# Patient Record
Sex: MALE | Race: ASIAN | NOT HISPANIC OR LATINO | Employment: UNEMPLOYED
[De-identification: names, ages, dates, MRNs, and addresses within clinical notes are randomized per-mention and may not be internally consistent; named-entity substitution may affect disease eponyms.]

---

## 2017-01-03 DIAGNOSIS — M10.061 IDIOPATHIC GOUT OF RIGHT KNEE, UNSPECIFIED CHRONICITY: Primary | ICD-10-CM

## 2017-01-03 RX ORDER — ALLOPURINOL 300 MG/1
300 TABLET ORAL DAILY
Qty: 90 TABLET | Refills: 1 | Status: SHIPPED
Start: 2017-01-03 | End: 2022-05-10

## 2021-05-15 ENCOUNTER — RECORDS - HEALTHEAST (OUTPATIENT)
Dept: ADMINISTRATIVE | Facility: OTHER | Age: 36
End: 2021-05-15

## 2021-05-15 ASSESSMENT — MIFFLIN-ST. JEOR: SCORE: 1828.47

## 2021-05-16 ENCOUNTER — COMMUNICATION - HEALTHEAST (OUTPATIENT)
Dept: SCHEDULING | Facility: CLINIC | Age: 36
End: 2021-05-16

## 2021-05-16 ENCOUNTER — RECORDS - HEALTHEAST (OUTPATIENT)
Dept: ADMINISTRATIVE | Facility: OTHER | Age: 36
End: 2021-05-16

## 2021-05-16 ASSESSMENT — MIFFLIN-ST. JEOR: SCORE: 1821.66

## 2021-05-17 ENCOUNTER — HOSPITAL ENCOUNTER (OUTPATIENT)
Dept: CARDIOLOGY | Facility: HOSPITAL | Age: 36
Discharge: HOME | End: 2021-05-17
Attending: INTERNAL MEDICINE

## 2021-05-17 ENCOUNTER — SURGERY - HEALTHEAST (OUTPATIENT)
Dept: CARDIOLOGY | Facility: HOSPITAL | Age: 36
End: 2021-05-17

## 2021-05-17 ENCOUNTER — RECORDS - HEALTHEAST (OUTPATIENT)
Dept: ADMINISTRATIVE | Facility: OTHER | Age: 36
End: 2021-05-17

## 2021-05-17 ASSESSMENT — MIFFLIN-ST. JEOR: SCORE: 1808.05

## 2021-05-18 ENCOUNTER — RECORDS - HEALTHEAST (OUTPATIENT)
Dept: ADMINISTRATIVE | Facility: OTHER | Age: 36
End: 2021-05-18

## 2021-05-18 ASSESSMENT — MIFFLIN-ST. JEOR: SCORE: 1767.23

## 2021-05-19 ENCOUNTER — RECORDS - HEALTHEAST (OUTPATIENT)
Dept: ADMINISTRATIVE | Facility: OTHER | Age: 36
End: 2021-05-19

## 2021-05-19 ASSESSMENT — MIFFLIN-ST. JEOR: SCORE: 1772.67

## 2021-05-25 ENCOUNTER — OFFICE VISIT - HEALTHEAST (OUTPATIENT)
Dept: CARDIOLOGY | Facility: CLINIC | Age: 36
End: 2021-05-25

## 2021-05-25 DIAGNOSIS — I10 ESSENTIAL HYPERTENSION, BENIGN: ICD-10-CM

## 2021-05-25 DIAGNOSIS — I50.41 ACUTE HEART FAILURE WITH REDUCED EJECTION FRACTION AND DIASTOLIC DYSFUNCTION (H): ICD-10-CM

## 2021-05-25 DIAGNOSIS — E11.59 TYPE 2 DIABETES MELLITUS WITH OTHER CIRCULATORY COMPLICATION, WITHOUT LONG-TERM CURRENT USE OF INSULIN (H): ICD-10-CM

## 2021-05-25 LAB
ANION GAP SERPL CALCULATED.3IONS-SCNC: 11 MMOL/L (ref 5–18)
BUN SERPL-MCNC: 44 MG/DL (ref 8–22)
CALCIUM SERPL-MCNC: 9.5 MG/DL (ref 8.5–10.5)
CHLORIDE BLD-SCNC: 100 MMOL/L (ref 98–107)
CO2 SERPL-SCNC: 22 MMOL/L (ref 22–31)
CREAT SERPL-MCNC: 2.28 MG/DL (ref 0.7–1.3)
GFR SERPL CREATININE-BSD FRML MDRD: 32 ML/MIN/1.73M2
GLUCOSE BLD-MCNC: 195 MG/DL (ref 70–125)
POTASSIUM BLD-SCNC: 4.6 MMOL/L (ref 3.5–5)
SODIUM SERPL-SCNC: 133 MMOL/L (ref 136–145)

## 2021-05-25 ASSESSMENT — MIFFLIN-ST. JEOR: SCORE: 1803.97

## 2021-05-26 ENCOUNTER — COMMUNICATION - HEALTHEAST (OUTPATIENT)
Dept: CARDIOLOGY | Facility: CLINIC | Age: 36
End: 2021-05-26

## 2021-05-26 DIAGNOSIS — I50.41 ACUTE HEART FAILURE WITH REDUCED EJECTION FRACTION AND DIASTOLIC DYSFUNCTION (H): ICD-10-CM

## 2021-05-27 VITALS — BODY MASS INDEX: 37.18 KG/M2 | WEIGHT: 209.9 LBS

## 2021-05-27 VITALS — WEIGHT: 218.9 LBS | BODY MASS INDEX: 38.78 KG/M2

## 2021-05-27 VITALS — WEIGHT: 211.1 LBS | BODY MASS INDEX: 37.39 KG/M2

## 2021-05-27 VITALS — BODY MASS INDEX: 39.57 KG/M2 | BODY MASS INDEX: 38.97 KG/M2 | WEIGHT: 223.4 LBS | WEIGHT: 220 LBS

## 2021-05-27 VITALS — BODY MASS INDEX: 38.26 KG/M2 | HEIGHT: 63 IN

## 2021-05-27 VITALS — WEIGHT: 221.9 LBS | BODY MASS INDEX: 39.31 KG/M2

## 2021-06-01 ENCOUNTER — COMMUNICATION - HEALTHEAST (OUTPATIENT)
Dept: SCHEDULING | Facility: CLINIC | Age: 36
End: 2021-06-01

## 2021-06-02 ENCOUNTER — COMMUNICATION - HEALTHEAST (OUTPATIENT)
Dept: CARDIOLOGY | Facility: CLINIC | Age: 36
End: 2021-06-02

## 2021-06-04 ENCOUNTER — COMMUNICATION - HEALTHEAST (OUTPATIENT)
Dept: CARDIOLOGY | Facility: CLINIC | Age: 36
End: 2021-06-04

## 2021-06-19 ENCOUNTER — HEALTH MAINTENANCE LETTER (OUTPATIENT)
Age: 36
End: 2021-06-19

## 2021-06-25 NOTE — TELEPHONE ENCOUNTER
----- Message from Jammie Webb CNP sent at 6/2/2021  9:52 AM CDT -----  Please inform pt of improved kidney function. No new orders, thank you  ----- Message -----  From: Karol Manuel RN  Sent: 6/2/2021   9:42 AM CDT  To: Jammie Webb CNP    FYI patient was in ED for URI 5/31 and got BMP done.

## 2021-06-25 NOTE — TELEPHONE ENCOUNTER
Called patient and LM that we would hold off on renewing Jardiance at this time and would defer to PMD. Also, await results of repeat BMP next week. [place] [place]

## 2021-06-25 NOTE — TELEPHONE ENCOUNTER
----- Message from Jammie Webb CNP sent at 5/26/2021  8:32 AM CDT -----  Please inform pt of lab results. Kidney function has decreased. Please have him stop torsemide and monitor for s/s of fluid retention and call us. BMP in one week. Thank you

## 2021-06-25 NOTE — TELEPHONE ENCOUNTER
Zulema was contacted today with lab results and recommendations to STOP torsemide at this time due to rising Creatinine. Monitor for s/s fluid retention. To Call if these occur. Repeat BMP in one week, orders placed. He verbalized understanding of this plan.    FYI to Sveta Hou RN BSN, CHFN

## 2021-06-25 NOTE — TELEPHONE ENCOUNTER
----- Message from Yesenia Dubon MD sent at 5/25/2021  6:16 PM CDT -----  Sorry I just noted creat is now up so no, do not renew, have primary address, the Jardiance can increase the creat.  LF  ----- Message -----  From: Genia Garsia RN  Sent: 5/25/2021   3:34 PM CDT  To: Yesenia Dubon MD    Ok to refill 10 mg daily Jardiance for patient? Or should this be taken over by PCP for DM management?   ----- Message -----  From: Jammie Webb CNP  Sent: 5/25/2021   3:30 PM CDT  To: MK Jang/Jan,    Will you please refill jardiance for this patient as Dr Dubon's started it in the hospital. Thank you            Jammie,  Dr. Dubon initially said to renew but reviewed labs and does not want to renew Jardiance just yet. I'll call and let him know- just an FYI is all.  Thanks!  Jan

## 2021-06-26 NOTE — PATIENT INSTRUCTIONS - HE
Zulema Hampton,    It was a pleasure to see you today at [facility].     My recommendations after this visit include:  - Please follow up with Dr Dubon in 6 weeks   - Please follow up with Jammie Webb in 3 weeks   - I have checked labs and will contact you with results  - Continue current medications    Jammie Webb CNP    What is the [facility]?     The [facility] is a heart failure specialty clinic within [facility].  You will work with your cardiologist, nurse practitioner, and nurses to carefully adjust medications and learn how to live with heart failure.  The Heart Failure Program will help you:      Better understand your chronic heart condition    Feel better and avoid hospital stays    Monitoring for Symptoms      Call the Heart Failure Phone Line ([phone]) if you have any of these symptoms:     Increased shortness of breath/shortness of breath at rest    Waking up at night with difficulty breathing    Unable to lie down for sleep due to symptoms or needing to sit upright for sleep    Weight gain of 2 pounds a day for 2 days in a row OR 5 pounds in 1 week    Increased swelling in your ankles or legs    Dizziness or lightheadedness    Medications       Take your medications as prescribed    Bring all your medications in their original bottles to every appointment    Avoid non-steroidal anti-inflammatory medications (Advil, Aleve, Ibuprofen, Naprosyn, Naproxen, Celebrex)    Do not stop taking your medications or begin taking over-the-counter or herbal medications without first talking to your doctor or nurse practitioner    Diet and Lifestyle       Limit sodium/salt to 2000 mg daily   o Read food labels for sodium content  o Do not add salt when cooking or add salt at the table    Weigh yourself every day and record in your daily weight log   o Call if you gain 2 pounds a day for 2 days in a row OR 5 pounds in 1  week  o Bring daily weight log to every appointment    Stay active, pace yourself, listen to your body, and rest when tired    Elevate your legs if they are swollen. Ask about using compression/support stockings    Stop smoking    Lose weight if you are overweight    Avoid drinking alcohol or limit amount    Stay updated on your immunizations including flu and pneumonia vaccines

## 2021-07-04 NOTE — PROGRESS NOTES
Progress Notes by Jammie Webb CNP at 5/25/2021  2:50 PM     Author: Jammie Webb CNP Service: -- Author Type: Nurse Practitioner    Filed: 5/25/2021  3:30 PM Encounter Date: 5/25/2021 Status: Signed    : Jammie Webb CNP (Nurse Practitioner)             Assessment/Recommendations   Assessment:    1. Nonischemic cardiomyopathy with systolic dysfunction, NYHA class II: Compensated.  He has mild fatigue and dyspnea on exertion.  He denies orthopnea, PND or edema.  We discussed monitoring symptoms, following a low-sodium diet, monitoring daily weights and heart failure treatment.    2.  Hypotension: Blood pressure 88/62.  He has occasional lightheadedness.  He states he drinks enough fluids during the day.    3.  Diabetes mellitus type 2: He continues Metformin and Lantus.  He was started on Jardiance 10 mg daily in the hospital.    Plan:  1.   Heart failure medications:  - Beta blocker therapy with metroprolol succinate 50 mg daily  - ACEI therapy with lisinopril 2.5 mg daily  - Aldosterone blocker therapy with spironolactone 25 mg daily.    - Diuretic therapy with torsemide 20 mg daily  - SLGT-2 inhibitor with Jardiance 10 mg daily  2.  BMP pending  3.  Continue current medications.  Unable to titrate due to hypotension  4.  Continue daily weights and low-sodium diet    Zulema Hampton will follow up with Dr. Dubon in 6 weeks and in the heart failure clinic in 3 weeks.     History of Present Illness/Subjective    Mr. Zulema Hampton is a 36 y.o. male seen at [facility] heart failure clinic today for continued follow-up.  He follows up for heart failure with reduced ejection fraction.  He was recently hospitalized with cough and orthopnea.  He was diuresed and lost 12 pounds.  He had an echocardiogram which showed ejection fraction of 23%.  He also had coronary angiogram which showed diffuse CAD with no PCI intervention.  He was started on appropriate medications.  He has a past  medical history significant for hypertension, diabetes, obesity.    He states he is doing well since hospitalization.  He continues to have mild cough and dyspnea on exertion.  He denies orthopnea, PND or edema.  He denies lightheadedness, shortness of breath, orthopnea, PND, palpitations, chest pain, abdominal fullness/bloating and lower extremity edema.      He is monitoring home weights which are stable around 215 pounds.      ECHOCARDIOGRAM: 5/16/2021  Summary      1.Left ventricle ejection fraction is severely decreased. The calculated left ventricular ejection fraction is 23%.    2.TAPSE is abnormal, which is consistent with abnormal right ventricular systolic function.    3.Left atrial and left ventricular chamber enlargement.    4.The aortic root is mildly dilated.    5.No hemodynamically significant valvular heart abnormalities.    6.No previous study for comparison.     Coronary angiogram with right heart catheterization 5/17/2021  Conclusion      Acute systolic heart failure in an obese man with newly diagnosed diabetes and hyperlipidemia.    Diffuse mild coronary atherosclerosis with arteries smaller than expected for a 36 year old man consistent with diabetic vasculopathy.    Elevated filling pressures: LV EDP 36 mmHg, PCWP 23-26 mmHg, mean PAP 37 mmHg, RV EDP 13 mmHg, mean RA 13 mmHg.    CO Evette 3.2 l/min, CO thermodilution 3.0 L/min    Estimated blood loss was <20 ml.      Recommendations    ? Recommended follow up with Dr. Dubon in 6 weeks.  ? Continue high dose statin therapy indefinitely.  ? Risk factor management for atherosclerosis.  ? Recommend GDMT therapy for CHF.  ? Follow up visit with Nurse Practitioner in 1-2 weeks.          Physical Examination Review of Systems   Vitals:    05/25/21 1453   BP: (!) 88/62   Pulse: 80   Resp: 16     Body mass index is 38.62 kg/m .  Wt Readings from Last 3 Encounters:   05/25/21 218 lb (98.9 kg)   05/19/21 211 lb 1.6 oz (95.8 kg)   06/08/16 (!) 230 lb  (104.3 kg)       General Appearance:   no acute distress   ENT/Mouth: membranes moist, no oral lesions or bleeding gums.      EYES:  no scleral icterus, normal conjunctivae   Neck: no carotid bruits or thyromegaly   Chest/Lungs:   lungs are clear to auscultation, no rales or wheezing, equal chest wall expansion    Cardiovascular:   Regular. Normal first and second heart sounds with no murmurs, rubs, or gallops; Jugular venous pressure normal, no edema bilaterally    Abdomen:  no organomegaly, masses, bruits, or tenderness; bowel sounds are present   Extremities: no cyanosis or clubbing   Skin: no xanthelasma, warm.    Neurologic: normal  bilateral, no tremors     Psychiatric: alert and oriented x3    General: WNL  Eyes: Visual Distubance  Ears/Nose/Throat: WNL  Lungs: Cough, Shortness of Breath  Heart: Shortness of Breath with activity  Stomach: Diarrhea, Nausea  Bladder: WNL  Muscle/Joints: WNL  Skin: WNL  Nervous System: WNL  Mental Health: WNL     Blood: WNL     Medical History  Surgical History Family History Social History   History reviewed. No pertinent past medical history. Past Surgical History:   Procedure Laterality Date   ? CV CORONARY ANGIOGRAM N/A 5/17/2021    Procedure: Coronary Angiogram;  Surgeon: Jodee Barcenas MD;  Location: [facility] Cardiac [facility];  Service: Cardiology   ? CV LEFT HEART CATHETERIZATION WO LEFT VETRICULOGRAM Left 5/17/2021    Procedure: Left Heart Catheterization Without Left Ventriculogram;  Surgeon: Jodee Barcenas MD;  Location: [facility] Cardiac [facility];  Service: Cardiology   ? CV RIGHT HEART CATH N/A 5/17/2021    Procedure: Right Heart Catheterization;  Surgeon: Jodee Barcenas MD;  Location: [facility]iac [facility];  Service: Cardiology    History reviewed. No pertinent family history. Social History     Socioeconomic History   ? Marital status: Single     Spouse name: Not on file   ? Number of children: Not on file   ? Years of education: Not on file   ?  Highest education level: Not on file   Occupational History   ? Not on file   Social Needs   ? Financial resource strain: Not on file   ? Food insecurity     Worry: Not on file     Inability: Not on file   ? Transportation needs     Medical: Not on file     Non-medical: Not on file   Tobacco Use   ? Smoking status: Never Smoker   ? Smokeless tobacco: Never Used   Substance and Sexual Activity   ? Alcohol use: Not Currently   ? Drug use: Never   ? Sexual activity: Not on file   Lifestyle   ? Physical activity     Days per week: Not on file     Minutes per session: Not on file   ? Stress: Not on file   Relationships   ? Social connections     Talks on phone: Not on file     Gets together: Not on file     Attends Synagogue service: Not on file     Active member of club or organization: Not on file     Attends meetings of clubs or organizations: Not on file     Relationship status: Not on file   ? Intimate partner violence     Fear of current or ex partner: Not on file     Emotionally abused: Not on file     Physically abused: Not on file     Forced sexual activity: Not on file   Other Topics Concern   ? Not on file   Social History Narrative   ? Not on file          Medications  Allergies   Current Outpatient Medications   Medication Sig Dispense Refill   ? acetaminophen (TYLENOL) 500 MG tablet Take 1-2 tablets (500-1,000 mg total) by mouth every 4 (four) hours as needed. 30 tablet 0   ? albuterol (PROAIR HFA;PROVENTIL HFA;VENTOLIN HFA) 90 mcg/actuation inhaler Inhale 4 puffs every 4 (four) hours as needed for wheezing.     ? atorvastatin (LIPITOR) 40 MG tablet Take 1 tablet (40 mg total) by mouth daily. 30 tablet 0   ? empagliflozin (JARDIANCE) 10 mg Tab tablet Take 1 tablet (10 mg total) by mouth every morning. 30 tablet 0   ? LANTUS SOLOSTAR U-100 INSULIN 100 unit/mL (3 mL) pen Inject 15 Units under the skin at bedtime. 11.65 Type 2 with hyperglycemia  Contact provider if insulin prescribed is not the preferred  insulin per insurance. 3 mL 1   ? lisinopriL (PRINIVIL,ZESTRIL) 2.5 MG tablet Take 1 tablet (2.5 mg total) by mouth daily. 90 tablet 0   ? metFORMIN (FORTAMET) 500 MG (OSM) 24 hr tablet Take 1 tablet (500 mg total) by mouth daily with breakfast. 30 tablet 0   ? metoprolol succinate (TOPROL-XL) 50 MG 24 hr tablet Take 1 tablet (50 mg total) by mouth daily. 90 tablet 0   ? spironolactone (ALDACTONE) 25 MG tablet Take 1 tablet (25 mg total) by mouth daily. 90 tablet 0   ? torsemide (DEMADEX) 20 MG tablet Take 1 tablet (20 mg total) by mouth daily. 90 tablet 0     No current facility-administered medications for this visit.     No Known Allergies      Lab Results    Chemistry/lipid CBC Cardiac Enzymes/BNP/TSH/INR   Lab Results   Component Value Date    CHOL 253 (H) 05/15/2021    HDL 54 05/15/2021    LDLCALC 150 (H) 05/15/2021    TRIG 243 (H) 05/15/2021    CREATININE 1.12 05/16/2021    BUN 19 05/16/2021    K 3.9 05/16/2021     05/16/2021     05/16/2021    CO2 23 05/16/2021    Lab Results   Component Value Date    WBC 10.9 05/16/2021    HGB 15.1 05/16/2021    HCT 45.4 05/16/2021    MCV 81 05/16/2021     05/19/2021    Lab Results   Component Value Date    TROPONINI 0.03 05/16/2021     (H) 05/15/2021    TSH 0.89 05/15/2021    INR 1.04 05/15/2021             This note has been dictated using voice recognition software. Any grammatical, typographical, or context distortions are unintentional and inherent to the software    30 minutes spent on the date of encounter doing chart review, review of outside records, review of test results, interpretation with above tests, patient visit and documentation.

## 2021-07-04 NOTE — LETTER
Letter by Karol Manuel RN at      Author: Karol Manuel RN Service: -- Author Type: --    Filed:  Encounter Date: 6/4/2021 Status: (Other)       Dear Mr. Hampton,    Below are the lab results from your recent visit.    Status:  Final result   Visible to patient:  No (not released) Next appt:  06/21/2021 at 02:50 PM in Cardiology (Jammie Webb, CNP)    Ref Range & Units 5/31/21 0906 5/25/21 1530 5/16/21 0415    Sodium 136 - 145 mmol/L 136  133Low   137     Potassium 3.5 - 5.0 mmol/L 4.6  4.6  3.9     Chloride 98 - 107 mmol/L 106  100  103     CO2 22 - 31 mmol/L 21Low   22  23     Anion Gap, Calculation 5 - 18 mmol/L 9  11  11     Glucose 70 - 125 mg/dL 134High   195High   235High      Calcium 8.5 - 10.5 mg/dL 9.3  9.5  8.7     BUN 8 - 22 mg/dL 25High   44High   19     Creatinine 0.70 - 1.30 mg/dL 1.57High   2.28High   1.12     GFR MDRD Af Amer >60 mL/min/1.73m2 60Low   39Low   >60     GFR MDRD Non Af Amer >60 mL/min/1.73m2 50Low   32Low   >60              These results show that your kidney function has improved since your last check. Please call [phone] if you have any questions.     Sincerely,  Sveta Manuel RN

## 2021-10-09 ENCOUNTER — HEALTH MAINTENANCE LETTER (OUTPATIENT)
Age: 36
End: 2021-10-09

## 2021-11-03 DIAGNOSIS — I50.41 ACUTE HEART FAILURE WITH REDUCED EJECTION FRACTION AND DIASTOLIC DYSFUNCTION (H): Primary | ICD-10-CM

## 2021-11-03 RX ORDER — SPIRONOLACTONE 25 MG/1
TABLET ORAL
Qty: 90 TABLET | Refills: 0 | Status: SHIPPED | OUTPATIENT
Start: 2021-11-03

## 2022-01-12 VITALS — BODY MASS INDEX: 37.4 KG/M2 | WEIGHT: 211.1 LBS | HEIGHT: 63 IN

## 2022-01-18 VITALS
HEART RATE: 80 BPM | DIASTOLIC BLOOD PRESSURE: 62 MMHG | WEIGHT: 218 LBS | RESPIRATION RATE: 16 BRPM | BODY MASS INDEX: 38.62 KG/M2 | SYSTOLIC BLOOD PRESSURE: 88 MMHG | HEIGHT: 63 IN

## 2022-01-29 ENCOUNTER — HEALTH MAINTENANCE LETTER (OUTPATIENT)
Age: 37
End: 2022-01-29

## 2022-04-25 DIAGNOSIS — I50.41 ACUTE HEART FAILURE WITH REDUCED EJECTION FRACTION AND DIASTOLIC DYSFUNCTION (H): ICD-10-CM

## 2022-04-26 RX ORDER — TORSEMIDE 20 MG/1
TABLET ORAL
Qty: 30 TABLET | Refills: 0 | Status: SHIPPED | OUTPATIENT
Start: 2022-04-26

## 2022-04-26 NOTE — TELEPHONE ENCOUNTER
I haven't seen the patient since 2016. I refilled torsemide for 30 days, he should come for a clinic visit with his primary.    Cristiana Pollard MD  Family Medicine

## 2022-04-27 ENCOUNTER — TELEPHONE (OUTPATIENT)
Dept: FAMILY MEDICINE | Facility: CLINIC | Age: 37
End: 2022-04-27
Payer: COMMERCIAL

## 2022-05-10 ENCOUNTER — OFFICE VISIT (OUTPATIENT)
Dept: FAMILY MEDICINE | Facility: CLINIC | Age: 37
End: 2022-05-10
Payer: COMMERCIAL

## 2022-05-10 VITALS
HEIGHT: 63 IN | TEMPERATURE: 98.1 F | RESPIRATION RATE: 22 BRPM | DIASTOLIC BLOOD PRESSURE: 84 MMHG | BODY MASS INDEX: 37.03 KG/M2 | OXYGEN SATURATION: 98 % | WEIGHT: 209 LBS | HEART RATE: 93 BPM | SYSTOLIC BLOOD PRESSURE: 117 MMHG

## 2022-05-10 DIAGNOSIS — E11.69 TYPE 2 DIABETES MELLITUS WITH OTHER SPECIFIED COMPLICATION, UNSPECIFIED WHETHER LONG TERM INSULIN USE (H): Primary | ICD-10-CM

## 2022-05-10 DIAGNOSIS — Z13.9 SCREENING FOR CONDITION: ICD-10-CM

## 2022-05-10 DIAGNOSIS — Z23 NEED FOR COVID-19 VACCINE: ICD-10-CM

## 2022-05-10 DIAGNOSIS — D64.9 ANEMIA, UNSPECIFIED TYPE: ICD-10-CM

## 2022-05-10 DIAGNOSIS — M1A.9XX0 CHRONIC GOUT WITHOUT TOPHUS, UNSPECIFIED CAUSE, UNSPECIFIED SITE: ICD-10-CM

## 2022-05-10 DIAGNOSIS — E66.01 MORBID OBESITY (H): ICD-10-CM

## 2022-05-10 DIAGNOSIS — N18.32 CHRONIC KIDNEY DISEASE, STAGE 3B (H): ICD-10-CM

## 2022-05-10 DIAGNOSIS — I50.22 CHRONIC SYSTOLIC CONGESTIVE HEART FAILURE (H): ICD-10-CM

## 2022-05-10 PROBLEM — I50.41: Status: ACTIVE | Noted: 2021-05-16

## 2022-05-10 PROBLEM — I50.9 CHF (CONGESTIVE HEART FAILURE) (H): Status: ACTIVE | Noted: 2022-05-10

## 2022-05-10 PROBLEM — I42.9 SECONDARY CARDIOMYOPATHY (H): Status: ACTIVE | Noted: 2021-05-16

## 2022-05-10 PROBLEM — E11.9 DIABETES MELLITUS, TYPE 2 (H): Status: ACTIVE | Noted: 2022-05-10

## 2022-05-10 LAB
ANION GAP SERPL CALCULATED.3IONS-SCNC: 17 MMOL/L (ref 5–18)
BUN SERPL-MCNC: 34 MG/DL (ref 8–22)
CALCIUM SERPL-MCNC: 9.9 MG/DL (ref 8.5–10.5)
CHLORIDE BLD-SCNC: 98 MMOL/L (ref 98–107)
CHOLEST SERPL-MCNC: 203 MG/DL
CO2 SERPL-SCNC: 19 MMOL/L (ref 22–31)
CREAT SERPL-MCNC: 2.09 MG/DL (ref 0.7–1.3)
CREAT UR-MCNC: 98 MG/DL
ERYTHROCYTE [DISTWIDTH] IN BLOOD BY AUTOMATED COUNT: 11.9 % (ref 10–15)
FASTING STATUS PATIENT QL REPORTED: ABNORMAL
GFR SERPL CREATININE-BSD FRML MDRD: 41 ML/MIN/1.73M2
GLUCOSE BLD-MCNC: 381 MG/DL (ref 70–125)
HBA1C MFR BLD: 14.6 % (ref 0–5.6)
HCT VFR BLD AUTO: 39.6 % (ref 40–53)
HCV AB SERPL QL IA: NEGATIVE
HDLC SERPL-MCNC: 30 MG/DL
HGB BLD-MCNC: 13.8 G/DL (ref 13.3–17.7)
HIV 1+2 AB+HIV1 P24 AG SERPL QL IA: NEGATIVE
LDLC SERPL CALC-MCNC: ABNORMAL MG/DL
MCH RBC QN AUTO: 28.2 PG (ref 26.5–33)
MCHC RBC AUTO-ENTMCNC: 34.8 G/DL (ref 31.5–36.5)
MCV RBC AUTO: 81 FL (ref 78–100)
MICROALBUMIN UR-MCNC: 1.28 MG/DL (ref 0–1.99)
MICROALBUMIN/CREAT UR: 13.1 MG/G CR
PLATELET # BLD AUTO: 334 10E3/UL (ref 150–450)
POTASSIUM BLD-SCNC: 4.2 MMOL/L (ref 3.5–5)
RBC # BLD AUTO: 4.89 10E6/UL (ref 4.4–5.9)
SODIUM SERPL-SCNC: 134 MMOL/L (ref 136–145)
TRIGL SERPL-MCNC: 579 MG/DL
URATE SERPL-MCNC: 14.8 MG/DL (ref 3–8)
WBC # BLD AUTO: 10.9 10E3/UL (ref 4–11)

## 2022-05-10 PROCEDURE — 99204 OFFICE O/P NEW MOD 45 MIN: CPT | Mod: 25 | Performed by: STUDENT IN AN ORGANIZED HEALTH CARE EDUCATION/TRAINING PROGRAM

## 2022-05-10 PROCEDURE — 85027 COMPLETE CBC AUTOMATED: CPT | Performed by: STUDENT IN AN ORGANIZED HEALTH CARE EDUCATION/TRAINING PROGRAM

## 2022-05-10 PROCEDURE — 80048 BASIC METABOLIC PNL TOTAL CA: CPT | Performed by: STUDENT IN AN ORGANIZED HEALTH CARE EDUCATION/TRAINING PROGRAM

## 2022-05-10 PROCEDURE — 82043 UR ALBUMIN QUANTITATIVE: CPT | Performed by: STUDENT IN AN ORGANIZED HEALTH CARE EDUCATION/TRAINING PROGRAM

## 2022-05-10 PROCEDURE — 86803 HEPATITIS C AB TEST: CPT | Performed by: STUDENT IN AN ORGANIZED HEALTH CARE EDUCATION/TRAINING PROGRAM

## 2022-05-10 PROCEDURE — 83036 HEMOGLOBIN GLYCOSYLATED A1C: CPT | Performed by: STUDENT IN AN ORGANIZED HEALTH CARE EDUCATION/TRAINING PROGRAM

## 2022-05-10 PROCEDURE — 80061 LIPID PANEL: CPT | Performed by: STUDENT IN AN ORGANIZED HEALTH CARE EDUCATION/TRAINING PROGRAM

## 2022-05-10 PROCEDURE — 84550 ASSAY OF BLOOD/URIC ACID: CPT | Performed by: STUDENT IN AN ORGANIZED HEALTH CARE EDUCATION/TRAINING PROGRAM

## 2022-05-10 PROCEDURE — 91306 COVID-19,PF,MODERNA (18+ YRS BOOSTER .25ML): CPT | Performed by: STUDENT IN AN ORGANIZED HEALTH CARE EDUCATION/TRAINING PROGRAM

## 2022-05-10 PROCEDURE — 87389 HIV-1 AG W/HIV-1&-2 AB AG IA: CPT | Performed by: STUDENT IN AN ORGANIZED HEALTH CARE EDUCATION/TRAINING PROGRAM

## 2022-05-10 PROCEDURE — 99207 PR FOOT EXAM NO CHARGE: CPT | Performed by: STUDENT IN AN ORGANIZED HEALTH CARE EDUCATION/TRAINING PROGRAM

## 2022-05-10 PROCEDURE — 36415 COLL VENOUS BLD VENIPUNCTURE: CPT | Performed by: STUDENT IN AN ORGANIZED HEALTH CARE EDUCATION/TRAINING PROGRAM

## 2022-05-10 PROCEDURE — 0064A COVID-19,PF,MODERNA (18+ YRS BOOSTER .25ML): CPT | Performed by: STUDENT IN AN ORGANIZED HEALTH CARE EDUCATION/TRAINING PROGRAM

## 2022-05-10 RX ORDER — METOPROLOL SUCCINATE 50 MG/1
50 TABLET, EXTENDED RELEASE ORAL DAILY
COMMUNITY
Start: 2021-05-25

## 2022-05-10 RX ORDER — METFORMIN HYDROCHLORIDE EXTENDED-RELEASE TABLETS 500 MG/1
500 TABLET, FILM COATED, EXTENDED RELEASE ORAL 2 TIMES DAILY
COMMUNITY
Start: 2021-05-19 | End: 2023-06-28 | Stop reason: DRUGHIGH

## 2022-05-10 RX ORDER — LISINOPRIL 2.5 MG/1
2.5 TABLET ORAL DAILY
COMMUNITY
Start: 2021-05-25

## 2022-05-10 RX ORDER — INSULIN GLARGINE 100 [IU]/ML
15 INJECTION, SOLUTION SUBCUTANEOUS
COMMUNITY
Start: 2021-05-19

## 2022-05-10 NOTE — PROGRESS NOTES
SUBJECTIVE:  Zulema Hampton is a 37 year old who presents today for follow up of DIABETES MELLITUS, chronic systolic heart failure, gout, obesity and hyperlipidemia.    I received a refill request for his torsemide but had not seen the patient since 2016 and therefore I asked him to come to clinic for a visit. His last interaction with our group had been during hospital admission 1 year ago for heart failure exacerbation and uncontrolled diabetes.        The patient brought all his meds in today in their bottles.     1.  Diabetes:  Patient glucose self monitoring: Not checking sugars at all right now due to stress of moving to a different apartment. He still has a meter and lancets and strips  Symptoms of low blood sugar? None  Patient describes their medication regimen as: metformin 500 BID and lantus 15 units daily  This is correct: yes   Side effects? None that he notices  Exercise outside of work or daily activities: Sometimes does strength training. No cardio  Diet: Low sodium, cooks at home.     Lives with mom and brother    Symptoms of neuropathy: None  No urinary symptoms   Foot checks:   Last eye exam: Unknown    2. History of cardiomyopathy with reduced ejection fraction  From what I can see from his admission, he did have more narrow coronaries on angiogram than would be expected for his age but no stents were placed and he has no known past MIs, so hard to say if this is truly ischemic cardiomyopathy. He had a negative drug screen at the time.     Currently,  No chest pain  No shortness of breath  Very compliant with meds  No swelling.   Takes: torsemide 20mg daily, lisinopril 2.5mg daily, metoprolol 50 XL daily, spironolactone 25mg daily  He was supposed to get a repeat echo in August of 2021, has not been done yet  He has not followed up with cardiology since discharge and he does not know who his cardiologist is      Gout  Not taking allopurinol  Gets flares once every 3 months    Health maintenance  "reviewed and appropriate orders placed? yes      BP Readings from Last 3 Encounters:   05/10/22 117/84   05/25/21 (!) 88/62   08/12/16 (!) 135/93       Lab Results   Component Value Date    A1C 12.8 05/15/2021       Recent Labs   Lab Test 05/15/21  2232   CHOL 253*   HDL 54   *   TRIG 243*       Wt Readings from Last 3 Encounters:   05/10/22 94.8 kg (209 lb)   05/25/21 98.9 kg (218 lb)   05/19/21 95.8 kg (211 lb 1.6 oz)       Current Outpatient Medications   Medication Sig Dispense Refill     insulin glargine (LANTUS SOLOSTAR) 100 UNIT/ML pen Inject 15 Units Subcutaneous daily (with dinner)       lisinopril (ZESTRIL) 2.5 MG tablet Take 2.5 mg by mouth daily       metFORMIN ER osmotic (FORTAMET) 500 MG 24 hr tablet Take 500 mg by mouth 2 times daily       metoprolol succinate ER (TOPROL XL) 50 MG 24 hr tablet Take 50 mg by mouth daily       spironolactone (ALDACTONE) 25 MG tablet TAKE 1 TABLET (25 MG TOTAL) BY MOUTH DAILY. 90 tablet 0     torsemide (DEMADEX) 20 MG tablet TAKE 1 TABLET (20 MG TOTAL) BY MOUTH DAILY. 30 tablet 0       Histories reviewed and updated in [employer].        ROS:  Constitutional: Denies unintentional weight loss/gain, no fevers, no change in energy level  HEENT: No headaches, no change in vision  Pulm: No shortness of breath or cough  CV: No chest pain, no palpitations  Abd: Denies abdominal pain, nausea, vomiting  Ext: Denies weakness, decreased sensation    EXAM:  Vitals: /84   Pulse 93   Temp 98.1  F (36.7  C) (Oral)   Resp 22   Ht 1.6 m (5' 3\")   Wt 94.8 kg (209 lb)   SpO2 98%   BMI 37.02 kg/m    BMIE= Body mass index is 37.02 kg/m .  GENERAL APPEARANCE: alert obese male and no acute distress  PSYCH: mentation appears normal and affect normal/bright  RESP: lungs clear to auscultation - no rales, rhonchi or wheezes  CV: regular rate and rhythm, normal S1 S2, no S3 or S4 and no murmur, click or rub -  EXT: no cyanosis or edema in lower extremities  SKIN: no venous stasis " changes  Foot Exam: normal DP and PT pulses, no trophic changes or ulcerative lesions, normal sensory exam and normal monofilament exam    Lab Results   Component Value Date    A1C 14.6 05/10/2022    A1C 12.8 05/15/2021       ASSESSMENT AND PLAN:    1. Type 2 diabetes mellitus with other specified complication, unspecified whether long term insulin use (H)  A1c obtained today and was 14.6. I advised the patient to increase lantus from 15 units daily to 20 units daily. I advised he needs to check at least a fasting blood sugar every day. Due for eye exam, has never had one, referral placed.   - Adult Eye Referral; Future  - Hemoglobin A1c  - Albumin Random Urine Quantitative with Creat Ratio  - WI FOOT EXAM NO CHARGE    2. Morbid obesity (H)  Due for lipid profile, obtained today. He is too young for pooled cohort score but next visit we should start him on a statin given how high his triglycerides were today and given the atherosclerosis seen on angiogram 1 year ago.   - Lipid Profile    3. Chronic systolic congestive heart failure (H)  He needs his repeat echo. Likely could titrate up on B blocker and/or lisinopril but since his symptoms have been so well controlled we focused on diabetes today. I also advised him he needs to see cardiology- he states he has the number and will call them.   - Basic metabolic panel  - Echocardiogram Complete; Future    4. Anemia, unspecified type  Was anemic on last cbc, rechecked today and hgb normal.   - CBC with platelets    5. Screening for condition  As long as we were drawing blood we obtained hep c and hiv screening which were negative.   - Hepatitis C antibody  - HIV Antigen Antibody Combo    6. Chronic gout without tophus, unspecified cause, unspecified site  Uric acid very high today (>14), will need to restart allopurinol. Will discuss next visit.   - Uric acid    7. Need for COVID-19 vaccine  Patient is young but he has many comorbidities and therefore we opted to get  him a booster today.  - COVID-19,PF,MODERNA (18+ YRS BOOSTER .25ML)    8. Chronic kidney disease, stage 3b (H)  On ACEi. Stable from last check        Medications Discontinued During This Encounter   Medication Reason     allopurinol (ZYLOPRIM) 300 MG tablet Stopped by Patient     predniSONE (DELTASONE) 20 MG tablet Therapy completed     diclofenac (VOLTAREN) 75 MG EC tablet Stopped by Patient         40 minutes spent on the day of the visit reviewing old records, seeing the patient, calling with results, coordinating referrals, and documenting care.     Cristiana Pollard MD  Family Medicine

## 2022-05-10 NOTE — PATIENT INSTRUCTIONS
Referral for :     Ophthalmology   LOCATION/PLACE/Provider :    [facility]   DATE & TIME :    June 1 at 3:20   PHONE :     [phone]  FAX :    [phone]  Appointment made by clinic staff/:    Jasmin

## 2022-05-11 PROBLEM — N18.32 CHRONIC KIDNEY DISEASE, STAGE 3B (H): Status: ACTIVE | Noted: 2022-05-11

## 2022-06-01 LAB — RETINOPATHY: NORMAL

## 2022-06-03 PROBLEM — H26.9 CATARACT, BILATERAL: Status: ACTIVE | Noted: 2022-06-03

## 2022-07-10 ENCOUNTER — HEALTH MAINTENANCE LETTER (OUTPATIENT)
Age: 37
End: 2022-07-10

## 2022-09-11 ENCOUNTER — HEALTH MAINTENANCE LETTER (OUTPATIENT)
Age: 37
End: 2022-09-11

## 2023-01-22 ENCOUNTER — HEALTH MAINTENANCE LETTER (OUTPATIENT)
Age: 38
End: 2023-01-22

## 2023-03-27 ENCOUNTER — TRANSFERRED RECORDS (OUTPATIENT)
Dept: HEALTH INFORMATION MANAGEMENT | Facility: CLINIC | Age: 38
End: 2023-03-27
Payer: COMMERCIAL

## 2023-03-27 ENCOUNTER — MEDICAL CORRESPONDENCE (OUTPATIENT)
Dept: HEALTH INFORMATION MANAGEMENT | Facility: CLINIC | Age: 38
End: 2023-03-27
Payer: COMMERCIAL

## 2023-03-27 LAB
ALBUMIN (URINE) MG/SPEC: 10 MG/L
ALBUMIN/CREATININE RATIO: <30 MG/G
CHOLESTEROL (EXTERNAL): 243 MG/DL
CREATININE (EXTERNAL): 1.74 MG/DL (ref 0.6–1.29)
CREATININE (URINE): 200 MG/DL (ref 10–300)
GFR ESTIMATED (EXTERNAL): 50 ML/MIN/1.73M2
GLUCOSE (EXTERNAL): 232 MG/DL (ref 65–99)
HBA1C MFR BLD: 7.1 %
HDLC SERPL-MCNC: 31 MG/DL
LDL CHOLESTEROL DIRECT (EXTERNAL): 132 MG/DL
NON HDL CHOLESTEROL (EXTERNAL): 212 MG/DL
POTASSIUM (EXTERNAL): 4.7 MMOL/L (ref 3.5–5.3)
TRIGLYCERIDES (EXTERNAL): 544 MG/DL
TSH SERPL-ACNC: 1.94 MIU/L (ref 0.4–4.5)

## 2023-03-28 ENCOUNTER — TRANSCRIBE ORDERS (OUTPATIENT)
Dept: OTHER | Age: 38
End: 2023-03-28

## 2023-03-28 DIAGNOSIS — R49.9 VOICE AND RESONANCE DISORDER: Primary | ICD-10-CM

## 2023-05-06 ENCOUNTER — HEALTH MAINTENANCE LETTER (OUTPATIENT)
Age: 38
End: 2023-05-06

## 2023-06-28 ENCOUNTER — OFFICE VISIT (OUTPATIENT)
Dept: CARDIOLOGY | Facility: CLINIC | Age: 38
End: 2023-06-28
Payer: COMMERCIAL

## 2023-06-28 VITALS
RESPIRATION RATE: 20 BRPM | WEIGHT: 189 LBS | BODY MASS INDEX: 33.48 KG/M2 | DIASTOLIC BLOOD PRESSURE: 63 MMHG | SYSTOLIC BLOOD PRESSURE: 94 MMHG | HEART RATE: 93 BPM

## 2023-06-28 DIAGNOSIS — E66.09 NON MORBID OBESITY DUE TO EXCESS CALORIES: ICD-10-CM

## 2023-06-28 DIAGNOSIS — E11.00 TYPE 2 DIABETES MELLITUS WITH HYPEROSMOLARITY WITHOUT COMA, UNSPECIFIED WHETHER LONG TERM INSULIN USE (H): ICD-10-CM

## 2023-06-28 DIAGNOSIS — I42.9 SECONDARY CARDIOMYOPATHY (H): Primary | ICD-10-CM

## 2023-06-28 DIAGNOSIS — N18.32 CHRONIC KIDNEY DISEASE, STAGE 3B (H): ICD-10-CM

## 2023-06-28 DIAGNOSIS — I50.41 ACUTE HEART FAILURE WITH REDUCED EJECTION FRACTION AND DIASTOLIC DYSFUNCTION (H): ICD-10-CM

## 2023-06-28 PROBLEM — E66.01 MORBID OBESITY (H): Status: RESOLVED | Noted: 2022-05-10 | Resolved: 2023-06-28

## 2023-06-28 PROCEDURE — 99214 OFFICE O/P EST MOD 30 MIN: CPT | Performed by: INTERNAL MEDICINE

## 2023-06-28 RX ORDER — EMPAGLIFLOZIN 10 MG/1
10 TABLET, FILM COATED ORAL DAILY
COMMUNITY
Start: 2023-06-08

## 2023-06-28 RX ORDER — BLOOD SUGAR DIAGNOSTIC
STRIP MISCELLANEOUS
COMMUNITY
Start: 2023-06-08

## 2023-06-28 RX ORDER — LANCETS
EACH MISCELLANEOUS
COMMUNITY
Start: 2023-06-08

## 2023-06-28 RX ORDER — FLURBIPROFEN SODIUM 0.3 MG/ML
SOLUTION/ DROPS OPHTHALMIC
COMMUNITY
Start: 2023-06-08

## 2023-06-28 RX ORDER — ALLOPURINOL 100 MG/1
1 TABLET ORAL DAILY
COMMUNITY
Start: 2023-06-08

## 2023-06-28 NOTE — LETTER
6/28/2023    Cristiana Pollard MD  [address] [facility] [address]    RE: Zulema Hampton       Dear Colleague,     I had the pleasure of seeing Zulema Hampton in the [facility].      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  We will recheck echo and if still diminished will then need to get MRI, would like to rule out any other vascular process.  If ejection fraction diminished will certainly need to set up for an ICD.  Agree with lisinopril, metoprolol, Aldactone.  If ejection fraction however normalized would back off on the dose of these meds as he is slightly orthostatic.    (I50.41) Acute heart failure with reduced ejection fraction and diastolic dysfunction (H)  Comment: No significant signs or symptoms and takes Demadex.    (E66.09) Non morbid obesity due to excess calories  Comment: Work on weight loss.    (E11.00) Type 2 diabetes mellitus with hyperosmolarity without coma, unspecified whether long term insulin use (H)  Comment: On insulin, metformin, Jardiance with hemoglobin A1c of 7.1 and defer to primary.    (N18.32) Chronic kidney disease, stage 3b (H)  Comment: Prior creatinine a year ago was 2.09 with a GFR 41.  Has had this checked and we will try to get results from primary clinic.    Plan  1.  Continue to work on weight loss.  2.  Would recommend echo, if ejection fraction improved back off on drugs, if her fraction fraction still diminished will get a cardiac MRI and then ICD implant.  3.  We will get results of creatinine from primary clinic.  4.  Follow-up me in 3 to 4 months or sooner if need be given these issues.    Subjective  CC: 38-year-old Laotian gentleman being seen in follow-up, I have not seen him in over 2 years, and only saw him once.  He has never  followed up with cardiology.  He has history of cardiomyopathy, felt to be nonischemic.  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  He does have a little orthostasis if he takes warm shower too long.    Medications  Current Outpatient Medications   Medication Sig Dispense Refill    ACCU-CHEK GUIDE test strip       allopurinol (ZYLOPRIM) 100 MG tablet Take 1 tablet by mouth daily      B-D U/F insulin pen needle       blood glucose monitoring (SOFTCLIX) lancets       insulin glargine (LANTUS SOLOSTAR) 100 UNIT/ML pen Inject 15 Units Subcutaneous daily (with dinner)      JARDIANCE 10 MG TABS tablet Take 10 mg by mouth daily      lisinopril (ZESTRIL) 2.5 MG tablet Take 2.5 mg by mouth daily      metFORMIN (GLUCOPHAGE) 1000 MG tablet Take 1,000 mg by mouth 2 times daily (with meals)      metoprolol succinate ER (TOPROL XL) 50 MG 24 hr tablet Take 50 mg by mouth daily      spironolactone (ALDACTONE) 25 MG tablet TAKE 1 TABLET (25 MG TOTAL) BY MOUTH DAILY. 90 tablet 0    torsemide (DEMADEX) 20 MG tablet TAKE 1 TABLET (20 MG TOTAL) BY MOUTH DAILY. 30 tablet 0       Objective  BP 94/63 (BP Location: Left arm, Patient Position: Sitting, Cuff Size: Adult Large)   Pulse 93   Resp 20   Wt 85.7 kg (189 lb)   BMI 33.48 kg/m      General Appearance:    Alert, cooperative, no distress, appears stated age   Head:    Normocephalic, without obvious abnormality, atraumatic   Throat:   Lips, mucosa, and tongue normal; teeth and gums normal   Neck:   Supple, symmetrical, trachea midline, no adenopathy;        thyroid:  No enlargement/tenderness/nodules; no carotid    bruit or JVD   Back:     Symmetric, no curvature, ROM normal, no CVA tenderness   Lungs:     Clear to auscultation bilaterally, respirations unlabored   Chest wall:    No tenderness or deformity   Heart:    Regular rate and  rhythm, S1 and S2 normal, no murmur, rub, possible S3   Abdomen:     Soft, non-tender, bowel sounds active all four quadrants,     no masses, no organomegaly   Extremities:   Normal, atraumatic, no cyanosis or edema   Pulses:   2+ and symmetric all extremities   Skin:   Skin color, texture, turgor normal, no rashes or lesions     Results    Lab Results personally reviewed   Lab Results   Component Value Date    CHOL 203 (H) 05/10/2022    CHOL 253 (H) 05/15/2021     Lab Results   Component Value Date    HDL 30 (L) 05/10/2022    HDL 54 05/15/2021     No components found for: LDLCALC  Lab Results   Component Value Date    TRIG 579 (H) 05/10/2022    TRIG 243 (H) 05/15/2021     Lab Results   Component Value Date    WBC 10.9 05/10/2022    HGB 13.8 05/10/2022    HCT 39.6 (L) 05/10/2022     05/10/2022     Lab Results   Component Value Date    BUN 34 (H) 05/10/2022     (L) 05/10/2022    CO2 19 (L) 05/10/2022             Thank you for allowing me to participate in the care of your patient.      Sincerely,     JAMES BRISCOE MD     [facility]  cc:   No referring provider defined for this encounter.

## 2023-06-28 NOTE — PATIENT INSTRUCTIONS
Zulema Hampton,  I enjoyed visiting with you again today.  I am glad to hear you are doing well.  Per our conversation let us get a relook a heart function.  I will plan on seeing you 3 months.  Atul Dubon

## 2023-06-28 NOTE — PROGRESS NOTES
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  We will recheck echo and if still diminished will then need to get MRI, would like to rule out any other vascular process.  If ejection fraction diminished will certainly need to set up for an ICD.  Agree with lisinopril, metoprolol, Aldactone.  If ejection fraction however normalized would back off on the dose of these meds as he is slightly orthostatic.    (I50.41) Acute heart failure with reduced ejection fraction and diastolic dysfunction (H)  Comment: No significant signs or symptoms and takes Demadex.    (E66.09) Non morbid obesity due to excess calories  Comment: Work on weight loss.    (E11.00) Type 2 diabetes mellitus with hyperosmolarity without coma, unspecified whether long term insulin use (H)  Comment: On insulin, metformin, Jardiance with hemoglobin A1c of 7.1 and defer to primary.    (N18.32) Chronic kidney disease, stage 3b (H)  Comment: Prior creatinine a year ago was 2.09 with a GFR 41.  Has had this checked and we will try to get results from primary clinic.    Plan  1.  Continue to work on weight loss.  2.  Would recommend echo, if ejection fraction improved back off on drugs, if her fraction fraction still diminished will get a cardiac MRI and then ICD implant.  3.  We will get results of creatinine from primary clinic.  4.  Follow-up me in 3 to 4 months or sooner if need be given these issues.    Subjective  CC: 38-year-old Laotian gentleman being seen in follow-up, I have not seen him in over 2 years, and only saw him once.  He has never followed up with cardiology.  He has history of cardiomyopathy, felt to be nonischemic.  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  He does have a little orthostasis if he takes warm shower too long.    Medications  Current Outpatient Medications   Medication Sig Dispense Refill     ACCU-CHEK GUIDE test strip        allopurinol (ZYLOPRIM) 100 MG tablet Take 1 tablet by mouth daily       B-D U/F insulin pen needle        blood glucose monitoring (SOFTCLIX) lancets        insulin glargine (LANTUS SOLOSTAR) 100 UNIT/ML pen Inject 15 Units Subcutaneous daily (with dinner)       JARDIANCE 10 MG TABS tablet Take 10 mg by mouth daily       lisinopril (ZESTRIL) 2.5 MG tablet Take 2.5 mg by mouth daily       metFORMIN (GLUCOPHAGE) 1000 MG tablet Take 1,000 mg by mouth 2 times daily (with meals)       metoprolol succinate ER (TOPROL XL) 50 MG 24 hr tablet Take 50 mg by mouth daily       spironolactone (ALDACTONE) 25 MG tablet TAKE 1 TABLET (25 MG TOTAL) BY MOUTH DAILY. 90 tablet 0     torsemide (DEMADEX) 20 MG tablet TAKE 1 TABLET (20 MG TOTAL) BY MOUTH DAILY. 30 tablet 0       Objective  BP 94/63 (BP Location: Left arm, Patient Position: Sitting, Cuff Size: Adult Large)   Pulse 93   Resp 20   Wt 85.7 kg (189 lb)   BMI 33.48 kg/m      General Appearance:    Alert, cooperative, no distress, appears stated age   Head:    Normocephalic, without obvious abnormality, atraumatic   Throat:   Lips, mucosa, and tongue normal; teeth and gums normal   Neck:   Supple, symmetrical, trachea midline, no adenopathy;        thyroid:  No enlargement/tenderness/nodules; no carotid    bruit or JVD   Back:     Symmetric, no curvature, ROM normal, no CVA tenderness   Lungs:     Clear to auscultation bilaterally, respirations unlabored   Chest wall:    No tenderness or deformity   Heart:    Regular rate and rhythm, S1 and S2 normal, no murmur, rub, possible S3   Abdomen:     Soft, non-tender, bowel sounds active all four quadrants,     no masses, no organomegaly   Extremities:   Normal,  atraumatic, no cyanosis or edema   Pulses:   2+ and symmetric all extremities   Skin:   Skin color, texture, turgor normal, no rashes or lesions     Results    Lab Results personally reviewed   Lab Results   Component Value Date    CHOL 203 (H) 05/10/2022    CHOL 253 (H) 05/15/2021     Lab Results   Component Value Date    HDL 30 (L) 05/10/2022    HDL 54 05/15/2021     No components found for: LDLCALC  Lab Results   Component Value Date    TRIG 579 (H) 05/10/2022    TRIG 243 (H) 05/15/2021     Lab Results   Component Value Date    WBC 10.9 05/10/2022    HGB 13.8 05/10/2022    HCT 39.6 (L) 05/10/2022     05/10/2022     Lab Results   Component Value Date    BUN 34 (H) 05/10/2022     (L) 05/10/2022    CO2 19 (L) 05/10/2022

## 2023-06-29 ENCOUNTER — TELEPHONE (OUTPATIENT)
Dept: CARDIOLOGY | Facility: CLINIC | Age: 38
End: 2023-06-29
Payer: COMMERCIAL

## 2023-06-29 NOTE — TELEPHONE ENCOUNTER
----- Message from Atul Dubon MD sent at 6/28/2023  5:04 PM CDT -----  This man has a creatinine over 2 and last checked a year ago, looks like his primary checked it but I cannot access it.  Can we get his most recent creatinine from his primary clinic?  Would also like to know what potassium is.  LF

## 2023-06-29 NOTE — TELEPHONE ENCOUNTER
Noted-results scanned in this AM. Results in media tab, scanned in at 0859 from [facility]. -[facility]         Dr. Dubon,  Results are in the media tab from PMD office; scanned in this morning. Let me know if you cannot locate results.   Thanks!  Mal

## 2023-07-29 ENCOUNTER — HEALTH MAINTENANCE LETTER (OUTPATIENT)
Age: 38
End: 2023-07-29

## 2023-10-13 ENCOUNTER — HOSPITAL ENCOUNTER (OUTPATIENT)
Dept: CARDIOLOGY | Facility: HOSPITAL | Age: 38
Discharge: HOME | End: 2023-10-13
Attending: INTERNAL MEDICINE | Admitting: INTERNAL MEDICINE
Payer: COMMERCIAL

## 2023-10-13 DIAGNOSIS — I42.9 SECONDARY CARDIOMYOPATHY (H): ICD-10-CM

## 2023-10-13 LAB — LVEF ECHO: NORMAL

## 2023-10-13 PROCEDURE — 93306 TTE W/DOPPLER COMPLETE: CPT | Mod: 26 | Performed by: INTERNAL MEDICINE

## 2023-10-13 PROCEDURE — 255N000002 HC RX 255 OP 636: Performed by: INTERNAL MEDICINE

## 2023-10-13 RX ADMIN — PERFLUTREN 2 ML: 6.52 INJECTION, SUSPENSION INTRAVENOUS at 14:45

## 2023-11-10 ENCOUNTER — OFFICE VISIT (OUTPATIENT)
Dept: OTOLARYNGOLOGY | Facility: CLINIC | Age: 38
End: 2023-11-10
Payer: COMMERCIAL

## 2023-11-10 DIAGNOSIS — R49.9 VOICE AND RESONANCE DISORDER: ICD-10-CM

## 2023-11-10 DIAGNOSIS — K21.9 GASTROESOPHAGEAL REFLUX DISEASE, UNSPECIFIED WHETHER ESOPHAGITIS PRESENT: Primary | ICD-10-CM

## 2023-11-10 PROCEDURE — 99203 OFFICE O/P NEW LOW 30 MIN: CPT | Mod: 25 | Performed by: OTOLARYNGOLOGY

## 2023-11-10 PROCEDURE — 31575 DIAGNOSTIC LARYNGOSCOPY: CPT | Performed by: OTOLARYNGOLOGY

## 2023-11-10 NOTE — PROGRESS NOTES
"CHIEF COMPLAINT: Patient presents with:  Voice Evaluation: Voice changes started 2-3 months ago. Pt eats spice foods, drinks pops, Pt also drinks coffee. Pt has indigestion.              HISTORY OF PRESENT ILLNESS    Zulema was seen at the behest Sharon Montenegro PA-C for voice concerns. He notices his voice is getting more \"raspy\" over the past 3 months.  Coffee is occasional.  Soda is occasional.   No heart but he gets GI uspset and indigestion occasionally. 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 Non smoker.          RSI = 4      REVIEW OF SYSTEMS    Review of Systems as per HPI and PMHx, otherwise 10 system review system are negative.       ALLERGIES    Patient has no known allergies.    CURRENT MEDICATIONS      Current Outpatient Medications:     ACCU-CHEK GUIDE test strip, , Disp: , Rfl:     allopurinol (ZYLOPRIM) 100 MG tablet, Take 1 tablet by mouth daily, Disp: , Rfl:     B-D U/F insulin pen needle, , Disp: , Rfl:     blood glucose monitoring (SOFTCLIX) lancets, , Disp: , Rfl:     insulin glargine (LANTUS SOLOSTAR) 100 UNIT/ML pen, Inject 15 Units Subcutaneous daily (with dinner), Disp: , Rfl:     JARDIANCE 10 MG TABS tablet, Take 10 mg by mouth daily, Disp: , Rfl:     lisinopril (ZESTRIL) 2.5 MG tablet, Take 2.5 mg by mouth daily, Disp: , Rfl:     metFORMIN (GLUCOPHAGE) 1000 MG tablet, Take 1,000 mg by mouth 2 times daily (with meals), Disp: , Rfl:     metoprolol succinate ER (TOPROL XL) 50 MG 24 hr tablet, Take 50 mg by mouth daily, Disp: , Rfl:     spironolactone (ALDACTONE) 25 MG tablet, TAKE 1 TABLET (25 MG TOTAL) BY MOUTH DAILY., Disp: 90 tablet, Rfl: 0    torsemide (DEMADEX) 20 MG tablet, TAKE 1 TABLET (20 MG TOTAL) BY MOUTH DAILY., Disp: 30 tablet, Rfl: 0     PAST MEDICAL HISTORY    PAST MEDICAL HISTORY: No past medical history on file.    PAST SURGICAL HISTORY    PAST SURGICAL HISTORY:   Past Surgical History:   Procedure Laterality Date    CV CORONARY ANGIOGRAM N/A 5/17/2021    " Procedure: Coronary Angiogram;  Surgeon: Jodee Barcenas MD;  Location: [facility] Cardiac Cath Lab;  Service: Cardiology    CV LEFT HEART CATHETERIZATION WITHOUT LEFT VENTRICULOGRAM Left 5/17/2021    Procedure: Left Heart Catheterization Without Left Ventriculogram;  Surgeon: Jodee Barcenas MD;  Location: [facility] Cardiac Cath Lab;  Service: Cardiology    CV RIGHT HEART CATHETERIZATION N/A 5/17/2021    Procedure: Right Heart Catheterization;  Surgeon: Jodee Barcenas MD;  Location: [facility] Cardiac Cath Lab;  Service: Cardiology    NO HISTORY OF SURGERY         FAMILY  HISTORY    FAMILY HISTORY:   Family History   Problem Relation Age of Onset    Diabetes No family hx of     Coronary Artery Disease No family hx of     Hypertension Mother     Hyperlipidemia No family hx of     Hyperlipidemia No family hx of     Cerebrovascular Disease No family hx of     Breast Cancer No family hx of     Colon Cancer No family hx of     Prostate Cancer No family hx of     Other Cancer No family hx of        SOCIAL HISTORY    SOCIAL HISTORY:   Social History     Tobacco Use    Smoking status: Never    Smokeless tobacco: Never   Substance Use Topics    Alcohol use: Not Currently     Alcohol/week: 0.0 standard drinks of alcohol        PHYSICAL EXAM    HEAD: Normal appearance and symmetry:  No cutaneous lesions.      NECK:  supple     EARS:    Right:   TM intact nl   LEFT:  TM intact nl     EYES:  EOMI    CN VII/XII:  intact     NOSE:     Dorsum:   straight  Septum:  midline  Mucosa:  moist        ORAL CAVITY/OROPHARYNX:     Lips:  Normal.  Tongue: normal, midline  Mucosa:   no lesions     NECK:  Trachea:  midline.              Thyroid:  normal              Adenopathy:  none        NEURO:   Alert and Oriented     GAIT AND STATION:  normal     RESPIRATORY:   Symmetry and Respiratory effort     PSYCH:  Normal mood and affect     SKIN:   warm and dry         FLEX LARYNGOSCOPY:    After obtaining consent, a flexible laryngoscope is  used to examine both nasal cavities, the nasopharynx, pharnx, and larynx.      Nasal cavity: wnl  NP: wnl  Pharnx: wnl  Larynx: thickening of PC; TVCs dull and mildly inflamed        IMPRESSION:    Encounter Diagnoses   Name Primary?    Voice and resonance disorder     Gastroesophageal reflux disease, unspecified whether esophagitis present Yes          RECOMMENDATIONS:      Orders Placed This Encounter   Procedures    LARYNGOSCOPY FLEX FIBEROPTIC, DIAGNOSTIC    XR Esophagram    Adult General Surg Referral        Reflux precautions.   Return visit 6 months for repeat larynoscopy

## 2023-11-10 NOTE — LETTER
"    11/10/2023         RE: Zulema Hampton  [address]  [place] [address]        Dear Colleague,    Thank you for referring your patient, Zulema Hampton, to the [facility]. Please see a copy of my visit note below.    CHIEF COMPLAINT: Patient presents with:  Voice Evaluation: Voice changes started 2-3 months ago. Pt eats spice foods, drinks pops, Pt also drinks coffee. Pt has indigestion.              HISTORY OF PRESENT ILLNESS    Zulema was seen at the behest of Sharon Gruber PA-C for voice concerns. He notices his voice is getting more \"raspy\" over the past 3 months.  Coffee is occasional.  Soda is occasional.   No heart but he gets GI uspset and indigestion occasionally. 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 Non smoker.          RSI = 4      REVIEW OF SYSTEMS    Review of Systems as per HPI and PMHx, otherwise 10 system review system are negative.       ALLERGIES    Patient has no known allergies.    CURRENT MEDICATIONS      Current Outpatient Medications:      ACCU-CHEK GUIDE test strip, , Disp: , Rfl:      allopurinol (ZYLOPRIM) 100 MG tablet, Take 1 tablet by mouth daily, Disp: , Rfl:      B-D U/F insulin pen needle, , Disp: , Rfl:      blood glucose monitoring (SOFTCLIX) lancets, , Disp: , Rfl:      insulin glargine (LANTUS SOLOSTAR) 100 UNIT/ML pen, Inject 15 Units Subcutaneous daily (with dinner), Disp: , Rfl:      JARDIANCE 10 MG TABS tablet, Take 10 mg by mouth daily, Disp: , Rfl:      lisinopril (ZESTRIL) 2.5 MG tablet, Take 2.5 mg by mouth daily, Disp: , Rfl:      metFORMIN (GLUCOPHAGE) 1000 MG tablet, Take 1,000 mg by mouth 2 times daily (with meals), Disp: , Rfl:      metoprolol succinate ER (TOPROL XL) 50 MG 24 hr tablet, Take 50 mg by mouth daily, Disp: , Rfl:      spironolactone (ALDACTONE) 25 MG tablet, TAKE 1 TABLET (25 MG TOTAL) BY MOUTH DAILY., Disp: 90 tablet, Rfl: 0     torsemide (DEMADEX) 20 MG tablet, TAKE 1 TABLET (20 MG TOTAL) BY MOUTH " DAILY., Disp: 30 tablet, Rfl: 0     PAST MEDICAL HISTORY    PAST MEDICAL HISTORY: No past medical history on file.    PAST SURGICAL HISTORY    PAST SURGICAL HISTORY:   Past Surgical History:   Procedure Laterality Date     CV CORONARY ANGIOGRAM N/A 5/17/2021    Procedure: Coronary Angiogram;  Surgeon: Jodee Barcenas MD;  Location: [facility] Cardiac Cath Lab;  Service: Cardiology     CV LEFT HEART CATHETERIZATION WITHOUT LEFT VENTRICULOGRAM Left 5/17/2021    Procedure: Left Heart Catheterization Without Left Ventriculogram;  Surgeon: Jodee Barcenas MD;  Location: [facility] Cardiac Cath Lab;  Service: Cardiology     CV RIGHT HEART CATHETERIZATION N/A 5/17/2021    Procedure: Right Heart Catheterization;  Surgeon: Jodee Barcenas MD;  Location: [facility] Cardiac Cath Lab;  Service: Cardiology     NO HISTORY OF SURGERY         FAMILY  HISTORY    FAMILY HISTORY:   Family History   Problem Relation Age of Onset     Diabetes No family hx of      Coronary Artery Disease No family hx of      Hypertension Mother      Hyperlipidemia No family hx of      Hyperlipidemia No family hx of      Cerebrovascular Disease No family hx of      Breast Cancer No family hx of      Colon Cancer No family hx of      Prostate Cancer No family hx of      Other Cancer No family hx of        SOCIAL HISTORY    SOCIAL HISTORY:   Social History     Tobacco Use     Smoking status: Never     Smokeless tobacco: Never   Substance Use Topics     Alcohol use: Not Currently     Alcohol/week: 0.0 standard drinks of alcohol        PHYSICAL EXAM    HEAD: Normal appearance and symmetry:  No cutaneous lesions.      NECK:  supple     EARS:    Right:   TM intact nl   LEFT:  TM intact nl     EYES:  EOMI    CN VII/XII:  intact     NOSE:     Dorsum:   straight  Septum:  midline  Mucosa:  moist        ORAL CAVITY/OROPHARYNX:     Lips:  Normal.  Tongue: normal, midline  Mucosa:   no lesions     NECK:  Trachea:  midline.              Thyroid:  normal               Adenopathy:  none        NEURO:   Alert and Oriented     GAIT AND STATION:  normal     RESPIRATORY:   Symmetry and Respiratory effort     PSYCH:  Normal mood and affect     SKIN:   warm and dry         FLEX LARYNGOSCOPY:    After obtaining consent, a flexible laryngoscope is used to examine both nasal cavities, the nasopharynx, pharnx, and larynx.      Nasal cavity: wnl  NP: wnl  Pharnx: wnl  Larynx: thickening of PC; TVCs dull and mildly inflamed        IMPRESSION:    Encounter Diagnoses   Name Primary?     Voice and resonance disorder      Gastroesophageal reflux disease, unspecified whether esophagitis present Yes          RECOMMENDATIONS:      Orders Placed This Encounter   Procedures     LARYNGOSCOPY FLEX FIBEROPTIC, DIAGNOSTIC     XR Esophagram     Adult General Surg Referral        Reflux precautions.   Return visit 6 months for repeat larynoscopy        Again, thank you for allowing me to participate in the care of your patient.        Sincerely,        Mayo Thibodeaux MD

## 2023-11-10 NOTE — PATIENT INSTRUCTIONS
You will follow up with Dr. Sage Chávez, a reflux specialist to review your swallow study.       Lifestyle changes:    Avoid eating 2-3 hours before bedtime.   You may find it helpful to elevate the head of your bed.     Avoid following foods that are likely to trigger acid reflux:    Coffee or tea (try LOW ACID coffee or herbal tea)  Anything that s fizzy or has caffeine in it  Alcohol   Citrus fruits, such as oranges and anuradha  Tomato based foods (salsa, pizza, lasanga)  Chocolate   Mint or peppermint  Fatty foods (ice cream)  Spicy foods  Onions and garlic      Try alkaline water (ph 9.5) instead of regular water

## 2023-11-14 ENCOUNTER — OFFICE VISIT (OUTPATIENT)
Dept: CARDIOLOGY | Facility: CLINIC | Age: 38
End: 2023-11-14
Payer: COMMERCIAL

## 2023-11-14 VITALS
OXYGEN SATURATION: 98 % | DIASTOLIC BLOOD PRESSURE: 70 MMHG | HEART RATE: 97 BPM | WEIGHT: 201 LBS | RESPIRATION RATE: 16 BRPM | BODY MASS INDEX: 35.61 KG/M2 | SYSTOLIC BLOOD PRESSURE: 106 MMHG

## 2023-11-14 DIAGNOSIS — Z79.4 TYPE 2 DIABETES MELLITUS WITH HYPEROSMOLARITY WITHOUT COMA, WITH LONG-TERM CURRENT USE OF INSULIN (H): ICD-10-CM

## 2023-11-14 DIAGNOSIS — E11.00 TYPE 2 DIABETES MELLITUS WITH HYPEROSMOLARITY WITHOUT COMA, WITH LONG-TERM CURRENT USE OF INSULIN (H): ICD-10-CM

## 2023-11-14 DIAGNOSIS — N18.32 CHRONIC KIDNEY DISEASE, STAGE 3B (H): ICD-10-CM

## 2023-11-14 DIAGNOSIS — I10 BENIGN ESSENTIAL HYPERTENSION: ICD-10-CM

## 2023-11-14 DIAGNOSIS — I42.9 SECONDARY CARDIOMYOPATHY (H): Primary | ICD-10-CM

## 2023-11-14 DIAGNOSIS — E66.09 NON MORBID OBESITY DUE TO EXCESS CALORIES: ICD-10-CM

## 2023-11-14 DIAGNOSIS — I50.41 ACUTE HEART FAILURE WITH REDUCED EJECTION FRACTION AND DIASTOLIC DYSFUNCTION (H): ICD-10-CM

## 2023-11-14 PROCEDURE — 99214 OFFICE O/P EST MOD 30 MIN: CPT | Performed by: INTERNAL MEDICINE

## 2023-11-14 NOTE — PROGRESS NOTES
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  Agree with lisinopril, metoprolol, Aldactone.  Repeat ejection fraction on echo 60 to 65%.  Consider cardiomyopathy most likely secondary to systemic benign essential hypertension and has resolved.    (I50.41) Acute heart failure with reduced ejection fraction and diastolic dysfunction (H)  Comment: No significant signs or symptoms currently and on no diuretics.    (I10) Benign essential hypertension  Comment: Under good control currently not orthostatic continue current meds and doses.    (E66.09) Non morbid obesity due to excess calories  Comment: Work on weight loss, he does admit to snoring, and given this would recommend sleep apnea evaluation but defer to primary.    (E11.00,  Z79.4) Type 2 diabetes mellitus with hyperosmolarity without coma, with long-term current use of insulin (H)  Comment: On insulin with hemoglobin A1c of 7.1 as well as metformin and Jardiance.  Defer to primary.    (N18.32) Chronic kidney disease, stage 3b (H)  Comment: So noted with GFR of 50 with a creatinine of 1.74 with a potassium good at 4.7 but need to continue to follow with Aldactone.    Plan  1.  Continue current medications.  2.  Would recommend continued work on weight loss.  3.  Discussed with primary but would recommend sleep apnea evaluation.  4.  Can follow with me in 1 year or sooner if needed.    Subjective  CC: 38-year-old La[place] gentleman here for follow-up visit.  Still living independently, still working as a PCA, getting along well without any syncope, presyncope, chest pains, palpitations, shortness of breath, PND, orthopnea or peripheral edema.    Medications  Current Outpatient Medications   Medication Sig Dispense Refill    ACCU-CHEK GUIDE test strip        allopurinol (ZYLOPRIM) 100 MG tablet Take 1 tablet by mouth daily      B-D U/F insulin pen needle       blood glucose monitoring (SOFTCLIX) lancets       insulin glargine (LANTUS SOLOSTAR) 100 UNIT/ML pen Inject 15 Units Subcutaneous daily (with dinner)      JARDIANCE 10 MG TABS tablet Take 10 mg by mouth daily      lisinopril (ZESTRIL) 2.5 MG tablet Take 2.5 mg by mouth daily      metFORMIN (GLUCOPHAGE) 1000 MG tablet Take 1,000 mg by mouth 2 times daily (with meals)      metoprolol succinate ER (TOPROL XL) 50 MG 24 hr tablet Take 50 mg by mouth daily      spironolactone (ALDACTONE) 25 MG tablet TAKE 1 TABLET (25 MG TOTAL) BY MOUTH DAILY. 90 tablet 0    torsemide (DEMADEX) 20 MG tablet TAKE 1 TABLET (20 MG TOTAL) BY MOUTH DAILY. 30 tablet 0       Objective  /70 (BP Location: Left arm, Patient Position: Sitting, Cuff Size: Adult Large)   Pulse 97   Resp 16   Wt 91.2 kg (201 lb)   SpO2 98%   BMI 35.61 kg/m      General Appearance:    Alert, cooperative, no distress, appears stated age   Head:    Normocephalic, without obvious abnormality, atraumatic   Throat:   Lips, mucosa, and tongue normal; teeth and gums normal   Neck:   Supple, symmetrical, trachea midline, no adenopathy;        thyroid:  No enlargement/tenderness/nodules; no carotid    bruit or JVD   Back:     Symmetric, no curvature, ROM normal, no CVA tenderness   Lungs:     Clear to auscultation bilaterally, respirations unlabored   Chest wall:    No tenderness or deformity   Heart:    Regular rate and rhythm, S1 and S2 normal, no murmur, rub   or gallop   Abdomen:     Soft, non-tender, bowel sounds active all four quadrants,     no masses, no organomegaly   Extremities:   Normal, atraumatic, no cyanosis or edema   Pulses:   2+ and symmetric all extremities   Skin:   Skin color, texture, turgor normal, no rashes or lesions     Results    Lab Results personally reviewed   Lab Results   Component Value Date    CHOL 203 (H) 05/10/2022    CHOL 253  "(H) 05/15/2021     Lab Results   Component Value Date    HDL 30 (L) 05/10/2022    HDL 54 05/15/2021     No components found for: \"LDLCALC\"  Lab Results   Component Value Date    TRIG 544 (H) 03/27/2023    TRIG 579 (H) 05/10/2022     Lab Results   Component Value Date    WBC 10.9 05/10/2022    HGB 13.8 05/10/2022    HCT 39.6 (L) 05/10/2022     05/10/2022     Lab Results   Component Value Date    BUN 34 (H) 05/10/2022     (L) 05/10/2022    CO2 19 (L) 05/10/2022           "

## 2023-11-14 NOTE — PATIENT INSTRUCTIONS
Zulema Hampton,  I enjoyed visiting with you again today.  I am glad to hear you are doing well.  Per our conversation speak with your primary about possible sleep study.  Continue all meds and if running out call me at [phone].  I will plan on seeing you 1 year.  Atul Dubon

## 2023-11-14 NOTE — LETTER
11/14/2023    Cristiana Pollard MD  [address] [facility] [address]    RE: Zulema ANNE Berta       Dear Colleague,     I had the pleasure of seeing Zulema Hampton in the [facility].      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  Agree with lisinopril, metoprolol, Aldactone.  Repeat ejection fraction on echo 60 to 65%.  Consider cardiomyopathy most likely secondary to systemic benign essential hypertension and has resolved.    (I50.41) Acute heart failure with reduced ejection fraction and diastolic dysfunction (H)  Comment: No significant signs or symptoms currently and on no diuretics.    (I10) Benign essential hypertension  Comment: Under good control currently not orthostatic continue current meds and doses.    (E66.09) Non morbid obesity due to excess calories  Comment: Work on weight loss, he does admit to snoring, and given this would recommend sleep apnea evaluation but defer to primary.    (E11.00,  Z79.4) Type 2 diabetes mellitus with hyperosmolarity without coma, with long-term current use of insulin (H)  Comment: On insulin with hemoglobin A1c of 7.1 as well as metformin and Jardiance.  Defer to primary.    (N18.32) Chronic kidney disease, stage 3b (H)  Comment: So noted with GFR of 50 with a creatinine of 1.74 with a potassium good at 4.7 but need to continue to follow with Aldactone.    Plan  1.  Continue current medications.  2.  Would recommend continued work on weight loss.  3.  Discussed with primary but would recommend sleep apnea evaluation.  4.  Can follow with me in 1 year or sooner if needed.    Subjective  CC: 38-year-old [place] gentleman here for follow-up visit.  Still living independently, still working as a PCA, getting along well without any syncope, presyncope,  chest pains, palpitations, shortness of breath, PND, orthopnea or peripheral edema.    Medications  Current Outpatient Medications   Medication Sig Dispense Refill    ACCU-CHEK GUIDE test strip       allopurinol (ZYLOPRIM) 100 MG tablet Take 1 tablet by mouth daily      B-D U/F insulin pen needle       blood glucose monitoring (SOFTCLIX) lancets       insulin glargine (LANTUS SOLOSTAR) 100 UNIT/ML pen Inject 15 Units Subcutaneous daily (with dinner)      JARDIANCE 10 MG TABS tablet Take 10 mg by mouth daily      lisinopril (ZESTRIL) 2.5 MG tablet Take 2.5 mg by mouth daily      metFORMIN (GLUCOPHAGE) 1000 MG tablet Take 1,000 mg by mouth 2 times daily (with meals)      metoprolol succinate ER (TOPROL XL) 50 MG 24 hr tablet Take 50 mg by mouth daily      spironolactone (ALDACTONE) 25 MG tablet TAKE 1 TABLET (25 MG TOTAL) BY MOUTH DAILY. 90 tablet 0    torsemide (DEMADEX) 20 MG tablet TAKE 1 TABLET (20 MG TOTAL) BY MOUTH DAILY. 30 tablet 0       Objective  /70 (BP Location: Left arm, Patient Position: Sitting, Cuff Size: Adult Large)   Pulse 97   Resp 16   Wt 91.2 kg (201 lb)   SpO2 98%   BMI 35.61 kg/m      General Appearance:    Alert, cooperative, no distress, appears stated age   Head:    Normocephalic, without obvious abnormality, atraumatic   Throat:   Lips, mucosa, and tongue normal; teeth and gums normal   Neck:   Supple, symmetrical, trachea midline, no adenopathy;        thyroid:  No enlargement/tenderness/nodules; no carotid    bruit or JVD   Back:     Symmetric, no curvature, ROM normal, no CVA tenderness   Lungs:     Clear to auscultation bilaterally, respirations unlabored   Chest wall:    No tenderness or deformity   Heart:    Regular rate and rhythm, S1 and S2 normal, no murmur, rub   or gallop   Abdomen:     Soft, non-tender, bowel sounds active all four quadrants,     no masses, no organomegaly   Extremities:   Normal, atraumatic, no cyanosis or edema   Pulses:   2+ and symmetric all  "extremities   Skin:   Skin color, texture, turgor normal, no rashes or lesions     Results    Lab Results personally reviewed   Lab Results   Component Value Date    CHOL 203 (H) 05/10/2022    CHOL 253 (H) 05/15/2021     Lab Results   Component Value Date    HDL 30 (L) 05/10/2022    HDL 54 05/15/2021     No components found for: \"LDLCALC\"  Lab Results   Component Value Date    TRIG 544 (H) 03/27/2023    TRIG 579 (H) 05/10/2022     Lab Results   Component Value Date    WBC 10.9 05/10/2022    HGB 13.8 05/10/2022    HCT 39.6 (L) 05/10/2022     05/10/2022     Lab Results   Component Value Date    BUN 34 (H) 05/10/2022     (L) 05/10/2022    CO2 19 (L) 05/10/2022               Thank you for allowing me to participate in the care of your patient.      Sincerely,     JAMES DUBON MD     [facility]  cc:   James Dubon MD  [address], [address]  [place] [address]      "

## 2023-12-15 ENCOUNTER — APPOINTMENT (OUTPATIENT)
Dept: RADIOLOGY | Facility: HOSPITAL | Age: 38
End: 2023-12-15
Attending: EMERGENCY MEDICINE
Payer: COMMERCIAL

## 2023-12-15 ENCOUNTER — HOSPITAL ENCOUNTER (EMERGENCY)
Facility: HOSPITAL | Age: 38
Discharge: HOME | End: 2023-12-15
Attending: EMERGENCY MEDICINE | Admitting: EMERGENCY MEDICINE
Payer: COMMERCIAL

## 2023-12-15 VITALS
HEART RATE: 91 BPM | DIASTOLIC BLOOD PRESSURE: 70 MMHG | SYSTOLIC BLOOD PRESSURE: 101 MMHG | HEIGHT: 63 IN | RESPIRATION RATE: 21 BRPM | WEIGHT: 210 LBS | TEMPERATURE: 96.8 F | BODY MASS INDEX: 37.21 KG/M2 | OXYGEN SATURATION: 100 %

## 2023-12-15 DIAGNOSIS — E86.0 DEHYDRATION: ICD-10-CM

## 2023-12-15 DIAGNOSIS — M10.9 ACUTE GOUT OF FOOT, UNSPECIFIED CAUSE, UNSPECIFIED LATERALITY: ICD-10-CM

## 2023-12-15 DIAGNOSIS — R19.7 DIARRHEA, UNSPECIFIED TYPE: ICD-10-CM

## 2023-12-15 DIAGNOSIS — R11.2 NAUSEA AND VOMITING, UNSPECIFIED VOMITING TYPE: ICD-10-CM

## 2023-12-15 LAB
ALBUMIN SERPL BCG-MCNC: 4.3 G/DL (ref 3.5–5.2)
ALP SERPL-CCNC: 72 U/L (ref 40–150)
ALT SERPL W P-5'-P-CCNC: 9 U/L (ref 0–70)
ANION GAP SERPL CALCULATED.3IONS-SCNC: 16 MMOL/L (ref 7–15)
AST SERPL W P-5'-P-CCNC: 11 U/L (ref 0–45)
BASOPHILS # BLD AUTO: 0 10E3/UL (ref 0–0.2)
BASOPHILS NFR BLD AUTO: 0 %
BILIRUB DIRECT SERPL-MCNC: <0.2 MG/DL (ref 0–0.3)
BILIRUB SERPL-MCNC: 0.6 MG/DL
BUN SERPL-MCNC: 24.5 MG/DL (ref 6–20)
CALCIUM SERPL-MCNC: 10.1 MG/DL (ref 8.6–10)
CHLORIDE SERPL-SCNC: 103 MMOL/L (ref 98–107)
CREAT SERPL-MCNC: 2.18 MG/DL (ref 0.67–1.17)
DEPRECATED HCO3 PLAS-SCNC: 22 MMOL/L (ref 22–29)
EGFRCR SERPLBLD CKD-EPI 2021: 38 ML/MIN/1.73M2
EOSINOPHIL # BLD AUTO: 0.1 10E3/UL (ref 0–0.7)
EOSINOPHIL NFR BLD AUTO: 1 %
ERYTHROCYTE [DISTWIDTH] IN BLOOD BY AUTOMATED COUNT: 12.9 % (ref 10–15)
GLUCOSE SERPL-MCNC: 156 MG/DL (ref 70–99)
HCT VFR BLD AUTO: 39.1 % (ref 40–53)
HGB BLD-MCNC: 12.6 G/DL (ref 13.3–17.7)
IMM GRANULOCYTES # BLD: 0.1 10E3/UL
IMM GRANULOCYTES NFR BLD: 1 %
LIPASE SERPL-CCNC: 25 U/L (ref 13–60)
LYMPHOCYTES # BLD AUTO: 3.1 10E3/UL (ref 0.8–5.3)
LYMPHOCYTES NFR BLD AUTO: 23 %
MAGNESIUM SERPL-MCNC: 1.9 MG/DL (ref 1.7–2.3)
MCH RBC QN AUTO: 27.4 PG (ref 26.5–33)
MCHC RBC AUTO-ENTMCNC: 32.2 G/DL (ref 31.5–36.5)
MCV RBC AUTO: 85 FL (ref 78–100)
MONOCYTES # BLD AUTO: 0.9 10E3/UL (ref 0–1.3)
MONOCYTES NFR BLD AUTO: 7 %
NEUTROPHILS # BLD AUTO: 9 10E3/UL (ref 1.6–8.3)
NEUTROPHILS NFR BLD AUTO: 68 %
NRBC # BLD AUTO: 0 10E3/UL
NRBC BLD AUTO-RTO: 0 /100
PLATELET # BLD AUTO: 447 10E3/UL (ref 150–450)
POTASSIUM SERPL-SCNC: 5.4 MMOL/L (ref 3.4–5.3)
PROT SERPL-MCNC: 7.8 G/DL (ref 6.4–8.3)
RBC # BLD AUTO: 4.6 10E6/UL (ref 4.4–5.9)
SODIUM SERPL-SCNC: 141 MMOL/L (ref 135–145)
TROPONIN T SERPL HS-MCNC: 103 NG/L
TROPONIN T SERPL HS-MCNC: 118 NG/L
WBC # BLD AUTO: 13.2 10E3/UL (ref 4–11)

## 2023-12-15 PROCEDURE — 36415 COLL VENOUS BLD VENIPUNCTURE: CPT | Performed by: EMERGENCY MEDICINE

## 2023-12-15 PROCEDURE — 85025 COMPLETE CBC W/AUTO DIFF WBC: CPT | Performed by: EMERGENCY MEDICINE

## 2023-12-15 PROCEDURE — 250N000013 HC RX MED GY IP 250 OP 250 PS 637: Performed by: EMERGENCY MEDICINE

## 2023-12-15 PROCEDURE — 258N000003 HC RX IP 258 OP 636: Performed by: EMERGENCY MEDICINE

## 2023-12-15 PROCEDURE — 83690 ASSAY OF LIPASE: CPT | Performed by: EMERGENCY MEDICINE

## 2023-12-15 PROCEDURE — 71045 X-RAY EXAM CHEST 1 VIEW: CPT

## 2023-12-15 PROCEDURE — 96361 HYDRATE IV INFUSION ADD-ON: CPT

## 2023-12-15 PROCEDURE — 93005 ELECTROCARDIOGRAM TRACING: CPT | Performed by: EMERGENCY MEDICINE

## 2023-12-15 PROCEDURE — 83735 ASSAY OF MAGNESIUM: CPT | Performed by: EMERGENCY MEDICINE

## 2023-12-15 PROCEDURE — 96374 THER/PROPH/DIAG INJ IV PUSH: CPT

## 2023-12-15 PROCEDURE — 82248 BILIRUBIN DIRECT: CPT | Performed by: EMERGENCY MEDICINE

## 2023-12-15 PROCEDURE — 99285 EMERGENCY DEPT VISIT HI MDM: CPT | Mod: 25

## 2023-12-15 PROCEDURE — 250N000011 HC RX IP 250 OP 636: Performed by: EMERGENCY MEDICINE

## 2023-12-15 PROCEDURE — 84484 ASSAY OF TROPONIN QUANT: CPT | Performed by: EMERGENCY MEDICINE

## 2023-12-15 RX ORDER — HYDROCODONE BITARTRATE AND ACETAMINOPHEN 5; 325 MG/1; MG/1
1 TABLET ORAL EVERY 6 HOURS PRN
Qty: 10 TABLET | Refills: 0 | Status: SHIPPED | OUTPATIENT
Start: 2023-12-15 | End: 2023-12-18

## 2023-12-15 RX ORDER — ASPIRIN 81 MG/1
324 TABLET, CHEWABLE ORAL ONCE
Status: COMPLETED | OUTPATIENT
Start: 2023-12-15 | End: 2023-12-15

## 2023-12-15 RX ORDER — INDOMETHACIN 25 MG/1
25 CAPSULE ORAL
Qty: 21 CAPSULE | Refills: 0 | Status: SHIPPED | OUTPATIENT
Start: 2023-12-15 | End: 2023-12-22

## 2023-12-15 RX ORDER — ONDANSETRON 2 MG/ML
4 INJECTION INTRAMUSCULAR; INTRAVENOUS ONCE
Status: COMPLETED | OUTPATIENT
Start: 2023-12-15 | End: 2023-12-15

## 2023-12-15 RX ORDER — ONDANSETRON 4 MG/1
4 TABLET, ORALLY DISINTEGRATING ORAL EVERY 8 HOURS PRN
Qty: 10 TABLET | Refills: 0 | Status: SHIPPED | OUTPATIENT
Start: 2023-12-15

## 2023-12-15 RX ADMIN — SODIUM CHLORIDE 1000 ML: 9 INJECTION, SOLUTION INTRAVENOUS at 16:27

## 2023-12-15 RX ADMIN — ONDANSETRON 4 MG: 2 INJECTION INTRAMUSCULAR; INTRAVENOUS at 16:30

## 2023-12-15 RX ADMIN — ASPIRIN 324 MG: 81 TABLET, CHEWABLE ORAL at 17:17

## 2023-12-15 ASSESSMENT — ACTIVITIES OF DAILY LIVING (ADL)
ADLS_ACUITY_SCORE: 35
ADLS_ACUITY_SCORE: 33

## 2023-12-15 NOTE — ED TRIAGE NOTES
Patient had gout flare up about 4 days and started vomiting 3 days ago.  Will try oral intake and will vomit afterwards.  Started having diarrhea as well.  Denies abdominal pain.

## 2023-12-15 NOTE — ED PROVIDER NOTES
EMERGENCY DEPARTMENT ENCOUNTER      NAME: Zulema Hampton  AGE: 38 year old male  YOB: 1985  MRN: [id number]  EVALUATION DATE & TIME: No admission date for patient encounter.    PCP: Cristiana Pollard    ED PROVIDER: Deisy Sanchez M.D.      Chief Complaint   Patient presents with    Vomiting         FINAL IMPRESSION:  1. Nausea and vomiting, unspecified vomiting type    2. Diarrhea, unspecified type    3. Dehydration    4. Acute gout of foot, unspecified cause, unspecified laterality        ED COURSE & MEDICAL DECISION MAKING:    Pertinent Labs & Imaging studies reviewed. (See chart for details)  ED Course as of 12/15/23 2317   Fri Dec 15, 2023   1539 Patient is a pleasant 38-year-old male with a history of diabetes and acute MI who comes in today for evaluation of nausea and vomiting and diarrhea.  He notes that about 5 to 6 days ago he started having a gout episode in his left foot.  He is on crutches for that but is not currently on any medications.  About 3 days ago he started vomiting and cannot keep anything down and then 2 days ago he started having diarrhea.  There is no blood in the vomit or stool.  He denies any sick contacts.  He is a known diabetic and report blood sugars have been okay.  He is on Jardiance, metformin, and long-acting insulin.  He denies any abdominal pain.  Denies any fevers or chills and he denies any chest pain.  He is tachycardic but otherwise nontoxic-appearing.  Will give him some IV fluids and some Zofran and see how he responds to that.  Have ordered some basic labs and a troponin EKG given his heart history.  Abdomen is benign and I do not think CT imaging is warranted.  I discussed this with him and he is comfortable with current plan.  Hopefully we get him feeling better and discharge later on today.   1659 Patient's troponin came back at 118.   1707 Positive troponin we will get a repeat EKG now and put the patient back into a room.   1726 I spoke with Dr. Hardwick  with cardiology.  He felt like the troponin was likely elevated related to his cardiomyopathy and his chronic kidney disease.  He felt that if the repeat troponin was stable the patient could discharge from a cardiac standpoint.  I think that is very reasonable.  He is not having any chest pain or shortness of breath.   1924 Troponin T, High Sensitivity (now)(!!)   1935 Repeat troponin is trending down.  I will check back in on the patient but last time I spoke with his nurse he was doing better.   1946 Discussed results with the patient.  He is feeling better.  He is comfortable with discharge home and we will work on getting him dismissed.     3:12 PM I met with the patient for the initial interview and physical examination. Discussed plan for treatment and workup in the ED.     4:59 PM lab called, trop 118.   5:26 PM I spoke with Dr. Bal, cardiology, about the patient.    Medical Decision Making    History:  Supplemental history from: patient   External Record(s) reviewed: I reviewed the patient's cardiology visit from 11/14/2023 with Dr. Min nicholas.  Patient has a nonischemic cardiomyopathy with diffuse coronary artery disease.  His ejection fraction was initially 23% and is now 60 to 65%.  The cardiomyopathy was likely related to his hypertension and has since resolved.  Patient has a history of a creatinine of 1.74 and a GFR of 50.    Work Up:  Emergent/Severe conditions considered and evaluated for: Electrolyte abnormality, renal failure, ACS, dehydration, DKA, anemia  I independently reviewed and interpreted EKG  In additional to work up documented, I considered the following work up: None  Medications given that require intensive monitoring for toxicity: None    External consultation:  Discussion of management with another provider: cardiology    Complicating factors:  Care impacted by chronic illness: CAD, hypertension, diabetes mellitus, HR with reduced EF, CKD3, MI  Care affected by social determinants of  health: N/A     Disposition considerations: Considered admission but after discussing with cardiology and getting repeat troponin we opted to discharge home after patient felt better  Prescriptions considered/prescribed: Zofran, Norco, indomethacin    At the conclusion of the encounter I discussed  the results of all of the tests and the disposition with patient.   All questions were answered.  The patient acknowledged understanding and was involved in the decision making regarding the overall care plan.      I discussed with patient the utility, limitations and findings of the exam/interventions/studies done during this visit as well as the list of differential diagnosis and symptoms to monitor/return to ER for.  Additional verbal discharge instructions were provided.     MEDICATIONS GIVEN IN THE EMERGENCY:  Medications   sodium chloride 0.9% BOLUS 1,000 mL (0 mLs Intravenous Stopped 12/15/23 1953)   ondansetron (ZOFRAN) injection 4 mg (4 mg Intravenous $Given 12/15/23 1630)   aspirin (ASA) chewable tablet 324 mg (324 mg Oral $Given 12/15/23 1717)       NEW PRESCRIPTIONS STARTED AT TODAY'S ER VISIT  Discharge Medication List as of 12/15/2023  7:53 PM        START taking these medications    Details   ondansetron (ZOFRAN ODT) 4 MG ODT tab Take 1 tablet (4 mg) by mouth every 8 hours as needed for nausea, Disp-10 tablet, R-0, E-Prescribe                =================================================================    HPI    Triage Note: Patient had gout flare up about 4 days and started vomiting 3 days ago.  Will try oral intake and will vomit afterwards.  Started having diarrhea as well.  Denies abdominal pain.              Patient information was obtained from: patient     Use of : N/A       Zulema Hampton is a 38 year old male who presents to be evaluated for emesis and diarrhea.     Patient reports nausea and vomiting sicne 12/11 and diarrhea starting 12/13 with an associated lack of appetite and  decreased food and water intake. Patient is currently having a gout flare up on his left foot and is ambulating iwht crutches, denies use of gout medication. Has history of gout. Denies hematochezia, abdominal pain, fever, chills, chest pain.     Patient notes his diabetes mellitus is controlled.     PAST MEDICAL HISTORY:  History reviewed. No pertinent past medical history.    PAST SURGICAL HISTORY:  Past Surgical History:   Procedure Laterality Date    CV CORONARY ANGIOGRAM N/A 5/17/2021    Procedure: Coronary Angiogram;  Surgeon: Jodee Barcenas MD;  Location: [facility] Cardiac Cath Lab;  Service: Cardiology    CV LEFT HEART CATHETERIZATION WITHOUT LEFT VENTRICULOGRAM Left 5/17/2021    Procedure: Left Heart Catheterization Without Left Ventriculogram;  Surgeon: Jodee Barcenas MD;  Location: [facility] Cardiac Cath Lab;  Service: Cardiology    CV RIGHT HEART CATHETERIZATION N/A 5/17/2021    Procedure: Right Heart Catheterization;  Surgeon: Jodee Barcenas MD;  Location: [facility] Cardiac Cath Lab;  Service: Cardiology    NO HISTORY OF SURGERY         CURRENT MEDICATIONS:    No current facility-administered medications for this encounter.    Current Outpatient Medications:     HYDROcodone-acetaminophen (NORCO) 5-325 MG tablet, Take 1 tablet by mouth every 6 hours as needed for severe pain, Disp: 10 tablet, Rfl: 0    indomethacin (INDOCIN) 25 MG capsule, Take 1 capsule (25 mg) by mouth 3 times daily (with meals) for 7 days, Disp: 21 capsule, Rfl: 0    ondansetron (ZOFRAN ODT) 4 MG ODT tab, Take 1 tablet (4 mg) by mouth every 8 hours as needed for nausea, Disp: 10 tablet, Rfl: 0    ACCU-CHEK GUIDE test strip, , Disp: , Rfl:     allopurinol (ZYLOPRIM) 100 MG tablet, Take 1 tablet by mouth daily, Disp: , Rfl:     B-D U/F insulin pen needle, , Disp: , Rfl:     blood glucose monitoring (SOFTCLIX) lancets, , Disp: , Rfl:     insulin glargine (LANTUS SOLOSTAR) 100 UNIT/ML pen, Inject 15 Units Subcutaneous daily (with  "dinner), Disp: , Rfl:     JARDIANCE 10 MG TABS tablet, Take 10 mg by mouth daily, Disp: , Rfl:     lisinopril (ZESTRIL) 2.5 MG tablet, Take 2.5 mg by mouth daily, Disp: , Rfl:     metFORMIN (GLUCOPHAGE) 1000 MG tablet, Take 1,000 mg by mouth 2 times daily (with meals), Disp: , Rfl:     metoprolol succinate ER (TOPROL XL) 50 MG 24 hr tablet, Take 50 mg by mouth daily, Disp: , Rfl:     spironolactone (ALDACTONE) 25 MG tablet, TAKE 1 TABLET (25 MG TOTAL) BY MOUTH DAILY., Disp: 90 tablet, Rfl: 0    torsemide (DEMADEX) 20 MG tablet, TAKE 1 TABLET (20 MG TOTAL) BY MOUTH DAILY., Disp: 30 tablet, Rfl: 0    ALLERGIES:  No Known Allergies    FAMILY HISTORY:  Family History   Problem Relation Age of Onset    Diabetes No family hx of     Coronary Artery Disease No family hx of     Hypertension Mother     Hyperlipidemia No family hx of     Hyperlipidemia No family hx of     Cerebrovascular Disease No family hx of     Breast Cancer No family hx of     Colon Cancer No family hx of     Prostate Cancer No family hx of     Other Cancer No family hx of        SOCIAL HISTORY:   Social History     Socioeconomic History    Marital status: Single   Tobacco Use    Smoking status: Never    Smokeless tobacco: Never   Vaping Use    Vaping Use: Never used   Substance and Sexual Activity    Alcohol use: Not Currently     Alcohol/week: 0.0 standard drinks of alcohol    Drug use: Never       PHYSICAL EXAM    VITAL SIGNS: /70   Pulse 91   Temp 96.8  F (36  C) (Temporal)   Resp 21   Ht 1.6 m (5' 3\")   Wt 95.3 kg (210 lb)   SpO2 100%   BMI 37.20 kg/m     GENERAL: Awake, alert, answering questions appropriately, ambulating with crutches  SPEECH:  Easy to understand speech, Normal volume and paola  PULMONARY: No respiratory distress, Lungs clear to auscultation bilaterally  CARDIOVASCULAR:tachycardic regular rhythm, Distal pulses present and normal.  ABDOMINAL: Soft, Nondistended, Nontender, No rebound or guarding, No palpable " masses  EXTREMITIES: No lower extremity edema.  PSYCH: Normal mood and affect     LAB:  All pertinent labs reviewed and interpreted.  Results for orders placed or performed during the hospital encounter of 12/15/23   XR Chest Port 1 View    Impression    IMPRESSION:     Heart size is normal. Lungs are clear bilaterally. Mediastinum and visualized bony structures are unremarkable.   Basic metabolic panel   Result Value Ref Range    Sodium 141 135 - 145 mmol/L    Potassium 5.4 (H) 3.4 - 5.3 mmol/L    Chloride 103 98 - 107 mmol/L    Carbon Dioxide (CO2) 22 22 - 29 mmol/L    Anion Gap 16 (H) 7 - 15 mmol/L    Urea Nitrogen 24.5 (H) 6.0 - 20.0 mg/dL    Creatinine 2.18 (H) 0.67 - 1.17 mg/dL    GFR Estimate 38 (L) >60 mL/min/1.73m2    Calcium 10.1 (H) 8.6 - 10.0 mg/dL    Glucose 156 (H) 70 - 99 mg/dL   Hepatic function panel   Result Value Ref Range    Protein Total 7.8 6.4 - 8.3 g/dL    Albumin 4.3 3.5 - 5.2 g/dL    Bilirubin Total 0.6 <=1.2 mg/dL    Alkaline Phosphatase 72 40 - 150 U/L    AST 11 0 - 45 U/L    ALT 9 0 - 70 U/L    Bilirubin Direct <0.20 0.00 - 0.30 mg/dL   Result Value Ref Range    Lipase 25 13 - 60 U/L   Result Value Ref Range    Magnesium 1.9 1.7 - 2.3 mg/dL   Troponin T, High Sensitivity (now)   Result Value Ref Range    Troponin T, High Sensitivity 118 (HH) <=22 ng/L   CBC with platelets and differential   Result Value Ref Range    WBC Count 13.2 (H) 4.0 - 11.0 10e3/uL    RBC Count 4.60 4.40 - 5.90 10e6/uL    Hemoglobin 12.6 (L) 13.3 - 17.7 g/dL    Hematocrit 39.1 (L) 40.0 - 53.0 %    MCV 85 78 - 100 fL    MCH 27.4 26.5 - 33.0 pg    MCHC 32.2 31.5 - 36.5 g/dL    RDW 12.9 10.0 - 15.0 %    Platelet Count 447 150 - 450 10e3/uL    % Neutrophils 68 %    % Lymphocytes 23 %    % Monocytes 7 %    % Eosinophils 1 %    % Basophils 0 %    % Immature Granulocytes 1 %    NRBCs per 100 WBC 0 <1 /100    Absolute Neutrophils 9.0 (H) 1.6 - 8.3 10e3/uL    Absolute Lymphocytes 3.1 0.8 - 5.3 10e3/uL    Absolute Monocytes  0.9 0.0 - 1.3 10e3/uL    Absolute Eosinophils 0.1 0.0 - 0.7 10e3/uL    Absolute Basophils 0.0 0.0 - 0.2 10e3/uL    Absolute Immature Granulocytes 0.1 <=0.4 10e3/uL    Absolute NRBCs 0.0 10e3/uL   Troponin T, High Sensitivity (now)   Result Value Ref Range    Troponin T, High Sensitivity 103 (HH) <=22 ng/L       RADIOLOGY:  XR Chest Port 1 View   Final Result   IMPRESSION:       Heart size is normal. Lungs are clear bilaterally. Mediastinum and visualized bony structures are unremarkable.              EKG:    Date and time: December 15, 2023 at 1600  Rate: 98 bpm  Rhythm: Normal sinus rhythm  NH interval: 152 ms  QRS interval: 108 ms  QT/QTc: 368/469 ms  ST changes or T wave changes: No acute ST or T wave abnormality  Change from prior ECG: No significant change from prior  I have independently reviewed and interpreted this EKG.\    Date and time: December 15, 2023 at 1712  Rate: 89 bpm  Rhythm: Normal sinus rhythm  NH interval: 158 ms  QRS interval: 110 ms  QT/QTc: 374/455 ms  ST changes or T wave changes: No acute ST or T wave abnormalities  Change from prior ECG: No significant change from prior  I have independently reviewed and interpreted this EKG.       I, Mery Hodges, am serving as a scribe to document services personally performed by Dr. Sanchez based on my observation and the provider's statements to me. I, Deisy Sanchez MD attest that Mery Hodges is acting in a scribe capacity, has observed my performance of the services and has documented them in accordance with my direction.    Deisy Sanchez M.D.  Emergency Medicine  [facility] EMERGENCY DEPARTMENT  [address] [address]  [phone]  Dept: [phone]       Deisy Sanchez MD  12/15/23 6011

## 2023-12-16 ENCOUNTER — HEALTH MAINTENANCE LETTER (OUTPATIENT)
Age: 38
End: 2023-12-16

## 2023-12-16 NOTE — DISCHARGE INSTRUCTIONS
You were seen in the Emergency Department today for evaluation of nausea and vomiting and diarrhea.  Your lab work showed slightly elevated troponin that was stable and likely related to your chronic heart and kidney problems.  Your imaging studies showed no significant cause of your symptoms. You were prescribed Zofran to take as needed for nausea. You should take all medications as prescribed.  Follow up with your primary care physician to ensure resolution of symptoms. Return if you have new or worsening symptoms.

## 2023-12-18 ENCOUNTER — PATIENT OUTREACH (OUTPATIENT)
Dept: CARE COORDINATION | Facility: CLINIC | Age: 38
End: 2023-12-18
Payer: COMMERCIAL

## 2023-12-18 NOTE — PROGRESS NOTES
Clinic Care Coordination Contact  Follow Up Progress Note      Assessment: The pt was recently in the ED, I called to check up on the pt, and help the pt setup a ED follow up. The pt was at  [facility] for vomiting. I called the pt,but got his vm, so I left a vm for the pt to give me a call back.    Care Gaps:    Health Maintenance Due   Topic Date Due    YEARLY PREVENTIVE VISIT  Never done    HF ACTION PLAN  Never done    PARATHYROID  Never done    PHOSPHORUS  Never done    ADVANCE CARE PLANNING  Never done    HEPATITIS B IMMUNIZATION (1 of 3 - 3-dose series) Never done    URINALYSIS  Never done    IPV IMMUNIZATION (4 of 4 - 4-dose series) 03/09/1987    Pneumococcal Vaccine: Pediatrics (0 to 5 Years) and At-Risk Patients (6 to 64 Years) (2 - PCV) 06/23/2022    PHQ-2 (once per calendar year)  01/01/2023    DIABETIC FOOT EXAM  05/11/2023    EYE EXAM  06/01/2023    A1C  06/27/2023    INFLUENZA VACCINE (1) 09/01/2023    COVID-19 Vaccine (5 - 2023-24 season) 09/01/2023    MICROALBUMIN  09/27/2023           Care Plans      Intervention/Education provided during outreach:               Plan:     Care Coordinator will follow up in

## 2023-12-19 ENCOUNTER — PATIENT OUTREACH (OUTPATIENT)
Dept: CARE COORDINATION | Facility: CLINIC | Age: 38
End: 2023-12-19
Payer: COMMERCIAL

## 2023-12-19 NOTE — PROGRESS NOTES
Clinic Care Coordination Contact  Follow Up Progress Note      Assessment: The pt was recently in the ED, I called to check up on the pt, and help the pt setup a ED follow up. The pt was at  [facility] for vomiting. I called the pt,but got his vm, so I left a vm for the pt to give me a call back.     Care Gaps:    Health Maintenance Due   Topic Date Due    YEARLY PREVENTIVE VISIT  Never done    HF ACTION PLAN  Never done    PARATHYROID  Never done    PHOSPHORUS  Never done    ADVANCE CARE PLANNING  Never done    HEPATITIS B IMMUNIZATION (1 of 3 - 3-dose series) Never done    URINALYSIS  Never done    IPV IMMUNIZATION (4 of 4 - 4-dose series) 03/09/1987    Pneumococcal Vaccine: Pediatrics (0 to 5 Years) and At-Risk Patients (6 to 64 Years) (2 - PCV) 06/23/2022    PHQ-2 (once per calendar year)  01/01/2023    DIABETIC FOOT EXAM  05/11/2023    EYE EXAM  06/01/2023    A1C  06/27/2023    INFLUENZA VACCINE (1) 09/01/2023    COVID-19 Vaccine (5 - 2023-24 season) 09/01/2023    MICROALBUMIN  09/27/2023           Care Plans      Intervention/Education provided during outreach:               Plan:     Care Coordinator will follow up in

## 2023-12-20 ENCOUNTER — PATIENT OUTREACH (OUTPATIENT)
Dept: CARE COORDINATION | Facility: CLINIC | Age: 38
End: 2023-12-20
Payer: COMMERCIAL

## 2023-12-20 NOTE — PROGRESS NOTES
Clinic Care Coordination Contact  Follow Up Progress Note      Assessment:  The pt was recently in the ED, I called to check up on the pt, and help the pt setup a ED follow up. The pt was at  [facility] for vomiting. I called the pt,but got his vm, so I left a vm for the pt to give me a call back.      Care Gaps:    Health Maintenance Due   Topic Date Due    YEARLY PREVENTIVE VISIT  Never done    HF ACTION PLAN  Never done    PARATHYROID  Never done    PHOSPHORUS  Never done    ADVANCE CARE PLANNING  Never done    HEPATITIS B IMMUNIZATION (1 of 3 - 3-dose series) Never done    URINALYSIS  Never done    IPV IMMUNIZATION (4 of 4 - 4-dose series) 03/09/1987    Pneumococcal Vaccine: Pediatrics (0 to 5 Years) and At-Risk Patients (6 to 64 Years) (2 - PCV) 06/23/2022    PHQ-2 (once per calendar year)  01/01/2023    DIABETIC FOOT EXAM  05/11/2023    EYE EXAM  06/01/2023    A1C  06/27/2023    INFLUENZA VACCINE (1) 09/01/2023    COVID-19 Vaccine (5 - 2023-24 season) 09/01/2023    MICROALBUMIN  09/27/2023         Care Plans      Intervention/Education provided during outreach:               Plan:     Care Coordinator will follow up in

## 2023-12-25 LAB
ATRIAL RATE - MUSE: 89 BPM
ATRIAL RATE - MUSE: 98 BPM
DIASTOLIC BLOOD PRESSURE - MUSE: NORMAL MMHG
DIASTOLIC BLOOD PRESSURE - MUSE: NORMAL MMHG
INTERPRETATION ECG - MUSE: NORMAL
INTERPRETATION ECG - MUSE: NORMAL
P AXIS - MUSE: 20 DEGREES
P AXIS - MUSE: 7 DEGREES
PR INTERVAL - MUSE: 152 MS
PR INTERVAL - MUSE: 158 MS
QRS DURATION - MUSE: 108 MS
QRS DURATION - MUSE: 110 MS
QT - MUSE: 368 MS
QT - MUSE: 374 MS
QTC - MUSE: 455 MS
QTC - MUSE: 469 MS
R AXIS - MUSE: -18 DEGREES
R AXIS - MUSE: -9 DEGREES
SYSTOLIC BLOOD PRESSURE - MUSE: NORMAL MMHG
SYSTOLIC BLOOD PRESSURE - MUSE: NORMAL MMHG
T AXIS - MUSE: 26 DEGREES
T AXIS - MUSE: 9 DEGREES
VENTRICULAR RATE- MUSE: 89 BPM
VENTRICULAR RATE- MUSE: 98 BPM

## 2024-01-10 ENCOUNTER — HOSPITAL ENCOUNTER (OUTPATIENT)
Dept: RADIOLOGY | Facility: HOSPITAL | Age: 39
Discharge: HOME | End: 2024-01-10
Attending: OTOLARYNGOLOGY | Admitting: OTOLARYNGOLOGY
Payer: COMMERCIAL

## 2024-01-10 DIAGNOSIS — K21.9 GASTROESOPHAGEAL REFLUX DISEASE, UNSPECIFIED WHETHER ESOPHAGITIS PRESENT: ICD-10-CM

## 2024-01-10 PROCEDURE — 250N000013 HC RX MED GY IP 250 OP 250 PS 637: Performed by: OTOLARYNGOLOGY

## 2024-01-10 PROCEDURE — 74221 X-RAY XM ESOPHAGUS 2CNTRST: CPT

## 2024-01-10 RX ADMIN — ANTACID/ANTIFLATULENT 4 G: 380; 550; 10; 10 GRANULE, EFFERVESCENT ORAL at 14:09

## 2024-05-04 ENCOUNTER — HEALTH MAINTENANCE LETTER (OUTPATIENT)
Age: 39
End: 2024-05-04

## 2024-09-21 ENCOUNTER — HEALTH MAINTENANCE LETTER (OUTPATIENT)
Age: 39
End: 2024-09-21

## 2025-01-12 ENCOUNTER — HEALTH MAINTENANCE LETTER (OUTPATIENT)
Age: 40
End: 2025-01-12

## 2025-04-26 ENCOUNTER — HEALTH MAINTENANCE LETTER (OUTPATIENT)
Age: 40
End: 2025-04-26

## 2025-08-09 ENCOUNTER — HEALTH MAINTENANCE LETTER (OUTPATIENT)
Age: 40
End: 2025-08-09